# Patient Record
Sex: FEMALE | Race: WHITE | NOT HISPANIC OR LATINO | Employment: FULL TIME | URBAN - NONMETROPOLITAN AREA
[De-identification: names, ages, dates, MRNs, and addresses within clinical notes are randomized per-mention and may not be internally consistent; named-entity substitution may affect disease eponyms.]

---

## 2020-10-19 ENCOUNTER — HOSPITAL ENCOUNTER (EMERGENCY)
Facility: HOSPITAL | Age: 65
Discharge: ANOTHER HOSPITAL | End: 2020-10-19
Attending: EMERGENCY MEDICINE | Admitting: EMERGENCY MEDICINE
Payer: COMMERCIAL

## 2020-10-19 ENCOUNTER — APPOINTMENT (EMERGENCY)
Dept: RADIOLOGY | Facility: HOSPITAL | Age: 65
End: 2020-10-19
Payer: COMMERCIAL

## 2020-10-19 VITALS
WEIGHT: 165.79 LBS | HEART RATE: 76 BPM | OXYGEN SATURATION: 98 % | DIASTOLIC BLOOD PRESSURE: 65 MMHG | HEIGHT: 65 IN | BODY MASS INDEX: 27.62 KG/M2 | RESPIRATION RATE: 21 BRPM | SYSTOLIC BLOOD PRESSURE: 107 MMHG

## 2020-10-19 DIAGNOSIS — S20.211A CHEST WALL HEMATOMA, RIGHT, INITIAL ENCOUNTER: Primary | ICD-10-CM

## 2020-10-19 LAB
ALBUMIN SERPL BCP-MCNC: 3.4 G/DL (ref 3.5–5)
ALP SERPL-CCNC: 84 U/L (ref 46–116)
ALT SERPL W P-5'-P-CCNC: 36 U/L (ref 12–78)
ANION GAP SERPL CALCULATED.3IONS-SCNC: 7 MMOL/L (ref 4–13)
APTT PPP: 23 SECONDS (ref 23–37)
AST SERPL W P-5'-P-CCNC: 22 U/L (ref 5–45)
ATRIAL RATE: 76 BPM
BASOPHILS # BLD AUTO: 0.08 THOUSANDS/ΜL (ref 0–0.1)
BASOPHILS NFR BLD AUTO: 1 % (ref 0–1)
BILIRUB SERPL-MCNC: 0.37 MG/DL (ref 0.2–1)
BUN SERPL-MCNC: 19 MG/DL (ref 5–25)
CALCIUM ALBUM COR SERPL-MCNC: 9.6 MG/DL (ref 8.3–10.1)
CALCIUM SERPL-MCNC: 9.1 MG/DL (ref 8.3–10.1)
CHLORIDE SERPL-SCNC: 104 MMOL/L (ref 100–108)
CO2 SERPL-SCNC: 30 MMOL/L (ref 21–32)
CREAT SERPL-MCNC: 0.85 MG/DL (ref 0.6–1.3)
EOSINOPHIL # BLD AUTO: 0.13 THOUSAND/ΜL (ref 0–0.61)
EOSINOPHIL NFR BLD AUTO: 2 % (ref 0–6)
ERYTHROCYTE [DISTWIDTH] IN BLOOD BY AUTOMATED COUNT: 11.7 % (ref 11.6–15.1)
GFR SERPL CREATININE-BSD FRML MDRD: 73 ML/MIN/1.73SQ M
GLUCOSE SERPL-MCNC: 142 MG/DL (ref 65–140)
HCT VFR BLD AUTO: 37.7 % (ref 34.8–46.1)
HGB BLD-MCNC: 12.8 G/DL (ref 11.5–15.4)
IMM GRANULOCYTES # BLD AUTO: 0.02 THOUSAND/UL (ref 0–0.2)
IMM GRANULOCYTES NFR BLD AUTO: 0 % (ref 0–2)
INR PPP: 0.94 (ref 0.84–1.19)
LYMPHOCYTES # BLD AUTO: 1.57 THOUSANDS/ΜL (ref 0.6–4.47)
LYMPHOCYTES NFR BLD AUTO: 24 % (ref 14–44)
MCH RBC QN AUTO: 31.2 PG (ref 26.8–34.3)
MCHC RBC AUTO-ENTMCNC: 34 G/DL (ref 31.4–37.4)
MCV RBC AUTO: 92 FL (ref 82–98)
MONOCYTES # BLD AUTO: 0.56 THOUSAND/ΜL (ref 0.17–1.22)
MONOCYTES NFR BLD AUTO: 8 % (ref 4–12)
NEUTROPHILS # BLD AUTO: 4.31 THOUSANDS/ΜL (ref 1.85–7.62)
NEUTS SEG NFR BLD AUTO: 65 % (ref 43–75)
NRBC BLD AUTO-RTO: 0 /100 WBCS
P AXIS: 45 DEGREES
PLATELET # BLD AUTO: 345 THOUSANDS/UL (ref 149–390)
PMV BLD AUTO: 9.5 FL (ref 8.9–12.7)
POTASSIUM SERPL-SCNC: 3.8 MMOL/L (ref 3.5–5.3)
PR INTERVAL: 140 MS
PROT SERPL-MCNC: 6.8 G/DL (ref 6.4–8.2)
PROTHROMBIN TIME: 12.4 SECONDS (ref 11.6–14.5)
QRS AXIS: 86 DEGREES
QRSD INTERVAL: 92 MS
QT INTERVAL: 388 MS
QTC INTERVAL: 436 MS
RBC # BLD AUTO: 4.1 MILLION/UL (ref 3.81–5.12)
SODIUM SERPL-SCNC: 141 MMOL/L (ref 136–145)
T WAVE AXIS: 66 DEGREES
VENTRICULAR RATE: 76 BPM
WBC # BLD AUTO: 6.67 THOUSAND/UL (ref 4.31–10.16)

## 2020-10-19 PROCEDURE — 96360 HYDRATION IV INFUSION INIT: CPT

## 2020-10-19 PROCEDURE — 85730 THROMBOPLASTIN TIME PARTIAL: CPT | Performed by: EMERGENCY MEDICINE

## 2020-10-19 PROCEDURE — 93005 ELECTROCARDIOGRAM TRACING: CPT

## 2020-10-19 PROCEDURE — 99285 EMERGENCY DEPT VISIT HI MDM: CPT

## 2020-10-19 PROCEDURE — 80053 COMPREHEN METABOLIC PANEL: CPT | Performed by: EMERGENCY MEDICINE

## 2020-10-19 PROCEDURE — 85610 PROTHROMBIN TIME: CPT | Performed by: EMERGENCY MEDICINE

## 2020-10-19 PROCEDURE — 36415 COLL VENOUS BLD VENIPUNCTURE: CPT | Performed by: EMERGENCY MEDICINE

## 2020-10-19 PROCEDURE — 99285 EMERGENCY DEPT VISIT HI MDM: CPT | Performed by: EMERGENCY MEDICINE

## 2020-10-19 PROCEDURE — 85025 COMPLETE CBC W/AUTO DIFF WBC: CPT | Performed by: EMERGENCY MEDICINE

## 2020-10-19 PROCEDURE — 71045 X-RAY EXAM CHEST 1 VIEW: CPT

## 2020-10-19 RX ORDER — ROSUVASTATIN CALCIUM 10 MG/1
10 TABLET, COATED ORAL 3 TIMES WEEKLY
COMMUNITY

## 2020-10-19 RX ORDER — IBUPROFEN 600 MG/1
600 TABLET ORAL AS NEEDED
COMMUNITY

## 2020-10-19 RX ADMIN — SODIUM CHLORIDE 1000 ML: 0.9 INJECTION, SOLUTION INTRAVENOUS at 13:24

## 2021-11-24 ENCOUNTER — TELEPHONE (OUTPATIENT)
Dept: SURGERY | Facility: HOSPITAL | Age: 66
End: 2021-11-24

## 2021-11-24 VITALS — WEIGHT: 163 LBS | HEIGHT: 65 IN | BODY MASS INDEX: 27.16 KG/M2

## 2021-11-24 RX ORDER — VITAMIN B COMPLEX
TABLET ORAL
COMMUNITY

## 2021-11-24 RX ORDER — MULTIVIT-MIN/IRON/FOLIC ACID/K 18-600-40
CAPSULE ORAL
COMMUNITY

## 2021-11-26 ENCOUNTER — TELEPHONE (OUTPATIENT)
Dept: SURGERY | Facility: HOSPITAL | Age: 66
End: 2021-11-26

## 2021-11-29 ENCOUNTER — ANESTHESIA (OUTPATIENT)
Dept: GASTROENTEROLOGY | Facility: HOSPITAL | Age: 66
End: 2021-11-29

## 2021-11-29 ENCOUNTER — ANESTHESIA EVENT (OUTPATIENT)
Dept: GASTROENTEROLOGY | Facility: HOSPITAL | Age: 66
End: 2021-11-29

## 2021-11-29 ENCOUNTER — HOSPITAL ENCOUNTER (OUTPATIENT)
Dept: GASTROENTEROLOGY | Facility: HOSPITAL | Age: 66
Setting detail: OUTPATIENT SURGERY
Discharge: HOME | End: 2021-11-29
Attending: INTERNAL MEDICINE | Admitting: INTERNAL MEDICINE
Payer: MEDICARE

## 2021-11-29 VITALS
DIASTOLIC BLOOD PRESSURE: 59 MMHG | OXYGEN SATURATION: 97 % | TEMPERATURE: 97.8 F | BODY MASS INDEX: 27.16 KG/M2 | WEIGHT: 163 LBS | HEIGHT: 65 IN | RESPIRATION RATE: 16 BRPM | HEART RATE: 69 BPM | SYSTOLIC BLOOD PRESSURE: 132 MMHG

## 2021-11-29 DIAGNOSIS — R93.3 ABNORMAL FINDINGS ON DIAGNOSTIC IMAGING OF OTHER PARTS OF DIGESTIVE TRACT: ICD-10-CM

## 2021-11-29 DIAGNOSIS — Z86.010 PERSONAL HISTORY OF COLONIC POLYPS: ICD-10-CM

## 2021-11-29 PROBLEM — E78.5 HYPERLIPIDEMIA: Status: ACTIVE | Noted: 2021-11-29

## 2021-11-29 RX ORDER — ACETAMINOPHEN 325 MG/1
650 TABLET ORAL EVERY 6 HOURS PRN
COMMUNITY

## 2021-11-29 RX ORDER — PROPOFOL 10 MG/ML
INJECTION, EMULSION INTRAVENOUS AS NEEDED
Status: DISCONTINUED | OUTPATIENT
Start: 2021-11-29 | End: 2021-11-29

## 2021-11-29 RX ORDER — LIDOCAINE HYDROCHLORIDE 10 MG/ML
INJECTION, SOLUTION EPIDURAL; INFILTRATION; INTRACAUDAL; PERINEURAL AS NEEDED
Status: DISCONTINUED | OUTPATIENT
Start: 2021-11-29 | End: 2021-11-29

## 2021-11-29 RX ORDER — PROPOFOL 10 MG/ML
INJECTION, EMULSION INTRAVENOUS CONTINUOUS PRN
Status: DISCONTINUED | OUTPATIENT
Start: 2021-11-29 | End: 2021-11-29

## 2021-11-29 RX ORDER — SODIUM CHLORIDE, SODIUM LACTATE, POTASSIUM CHLORIDE, CALCIUM CHLORIDE 600; 310; 30; 20 MG/100ML; MG/100ML; MG/100ML; MG/100ML
INJECTION, SOLUTION INTRAVENOUS CONTINUOUS PRN
Status: DISCONTINUED | OUTPATIENT
Start: 2021-11-29 | End: 2021-11-29

## 2021-11-29 RX ADMIN — PROPOFOL 120 MCG/KG/MIN: 10 INJECTION, EMULSION INTRAVENOUS at 09:30

## 2021-11-29 RX ADMIN — LIDOCAINE HYDROCHLORIDE 50 MG: 10 INJECTION, SOLUTION EPIDURAL; INFILTRATION; INTRACAUDAL at 09:30

## 2021-11-29 RX ADMIN — PROPOFOL 100 MG: 10 INJECTION, EMULSION INTRAVENOUS at 09:30

## 2021-11-29 RX ADMIN — SODIUM CHLORIDE, SODIUM LACTATE, POTASSIUM CHLORIDE, AND CALCIUM CHLORIDE: .6; .31; .03; .02 INJECTION, SOLUTION INTRAVENOUS at 09:24

## 2022-03-30 ENCOUNTER — APPOINTMENT (OUTPATIENT)
Dept: LAB | Facility: HOSPITAL | Age: 67
End: 2022-03-30
Payer: MEDICARE

## 2022-03-30 DIAGNOSIS — R73.01 ELEVATED FASTING BLOOD SUGAR: ICD-10-CM

## 2022-03-30 DIAGNOSIS — E78.2 MIXED HYPERLIPIDEMIA: ICD-10-CM

## 2022-03-30 DIAGNOSIS — M81.0 SENILE OSTEOPOROSIS: Primary | ICD-10-CM

## 2022-03-30 DIAGNOSIS — D50.0 BLOOD LOSS ANEMIA: ICD-10-CM

## 2022-03-30 LAB
ALT SERPL W P-5'-P-CCNC: 32 U/L (ref 12–78)
AST SERPL W P-5'-P-CCNC: 17 U/L (ref 5–45)
BASOPHILS # BLD AUTO: 0.06 THOUSANDS/ΜL (ref 0–0.1)
BASOPHILS NFR BLD AUTO: 1 % (ref 0–1)
CALCIUM SERPL-MCNC: 9 MG/DL (ref 8.3–10.1)
CHOLEST SERPL-MCNC: 230 MG/DL
EOSINOPHIL # BLD AUTO: 0.08 THOUSAND/ΜL (ref 0–0.61)
EOSINOPHIL NFR BLD AUTO: 2 % (ref 0–6)
ERYTHROCYTE [DISTWIDTH] IN BLOOD BY AUTOMATED COUNT: 11.9 % (ref 11.6–15.1)
EST. AVERAGE GLUCOSE BLD GHB EST-MCNC: 114 MG/DL
GLUCOSE P FAST SERPL-MCNC: 107 MG/DL (ref 65–99)
HBA1C MFR BLD: 5.6 %
HCT VFR BLD AUTO: 46.6 % (ref 34.8–46.1)
HDLC SERPL-MCNC: 83 MG/DL
HGB BLD-MCNC: 15.5 G/DL (ref 11.5–15.4)
IMM GRANULOCYTES # BLD AUTO: 0.01 THOUSAND/UL (ref 0–0.2)
IMM GRANULOCYTES NFR BLD AUTO: 0 % (ref 0–2)
LDLC SERPL CALC-MCNC: 137 MG/DL (ref 0–100)
LYMPHOCYTES # BLD AUTO: 1.18 THOUSANDS/ΜL (ref 0.6–4.47)
LYMPHOCYTES NFR BLD AUTO: 24 % (ref 14–44)
MAGNESIUM SERPL-MCNC: 1.5 MG/DL (ref 1.6–2.6)
MCH RBC QN AUTO: 30.3 PG (ref 26.8–34.3)
MCHC RBC AUTO-ENTMCNC: 33.3 G/DL (ref 31.4–37.4)
MCV RBC AUTO: 91 FL (ref 82–98)
MONOCYTES # BLD AUTO: 0.47 THOUSAND/ΜL (ref 0.17–1.22)
MONOCYTES NFR BLD AUTO: 10 % (ref 4–12)
NEUTROPHILS # BLD AUTO: 3.07 THOUSANDS/ΜL (ref 1.85–7.62)
NEUTS SEG NFR BLD AUTO: 63 % (ref 43–75)
NONHDLC SERPL-MCNC: 147 MG/DL
NRBC BLD AUTO-RTO: 0 /100 WBCS
PHOSPHATE SERPL-MCNC: 4.3 MG/DL (ref 2.3–4.1)
PLATELET # BLD AUTO: 326 THOUSANDS/UL (ref 149–390)
PMV BLD AUTO: 9.5 FL (ref 8.9–12.7)
RBC # BLD AUTO: 5.11 MILLION/UL (ref 3.81–5.12)
TRIGL SERPL-MCNC: 49 MG/DL
WBC # BLD AUTO: 4.87 THOUSAND/UL (ref 4.31–10.16)

## 2022-03-30 PROCEDURE — 84450 TRANSFERASE (AST) (SGOT): CPT

## 2022-03-30 PROCEDURE — 83036 HEMOGLOBIN GLYCOSYLATED A1C: CPT

## 2022-03-30 PROCEDURE — 84100 ASSAY OF PHOSPHORUS: CPT

## 2022-03-30 PROCEDURE — 36415 COLL VENOUS BLD VENIPUNCTURE: CPT

## 2022-03-30 PROCEDURE — 85025 COMPLETE CBC W/AUTO DIFF WBC: CPT

## 2022-03-30 PROCEDURE — 80061 LIPID PANEL: CPT

## 2022-03-30 PROCEDURE — 82310 ASSAY OF CALCIUM: CPT

## 2022-03-30 PROCEDURE — 82947 ASSAY GLUCOSE BLOOD QUANT: CPT

## 2022-03-30 PROCEDURE — 83735 ASSAY OF MAGNESIUM: CPT

## 2022-03-30 PROCEDURE — 84460 ALANINE AMINO (ALT) (SGPT): CPT

## 2022-05-10 ENCOUNTER — APPOINTMENT (OUTPATIENT)
Dept: LAB | Facility: HOSPITAL | Age: 67
End: 2022-05-10
Payer: MEDICARE

## 2022-05-10 DIAGNOSIS — R23.8 VESICULAR ERUPTION OF SKIN: ICD-10-CM

## 2022-05-10 LAB
APTT PPP: 28 SECONDS (ref 23–37)
BASOPHILS # BLD AUTO: 0.06 THOUSANDS/ΜL (ref 0–0.1)
BASOPHILS NFR BLD AUTO: 1 % (ref 0–1)
EOSINOPHIL # BLD AUTO: 0.08 THOUSAND/ΜL (ref 0–0.61)
EOSINOPHIL NFR BLD AUTO: 1 % (ref 0–6)
ERYTHROCYTE [DISTWIDTH] IN BLOOD BY AUTOMATED COUNT: 11.8 % (ref 11.6–15.1)
HCT VFR BLD AUTO: 43.3 % (ref 34.8–46.1)
HGB BLD-MCNC: 14.5 G/DL (ref 11.5–15.4)
IMM GRANULOCYTES # BLD AUTO: 0.02 THOUSAND/UL (ref 0–0.2)
IMM GRANULOCYTES NFR BLD AUTO: 0 % (ref 0–2)
INR PPP: 0.88 (ref 0.84–1.19)
LYMPHOCYTES # BLD AUTO: 1.69 THOUSANDS/ΜL (ref 0.6–4.47)
LYMPHOCYTES NFR BLD AUTO: 22 % (ref 14–44)
MCH RBC QN AUTO: 30.1 PG (ref 26.8–34.3)
MCHC RBC AUTO-ENTMCNC: 33.5 G/DL (ref 31.4–37.4)
MCV RBC AUTO: 90 FL (ref 82–98)
MONOCYTES # BLD AUTO: 0.67 THOUSAND/ΜL (ref 0.17–1.22)
MONOCYTES NFR BLD AUTO: 9 % (ref 4–12)
NEUTROPHILS # BLD AUTO: 5.14 THOUSANDS/ΜL (ref 1.85–7.62)
NEUTS SEG NFR BLD AUTO: 67 % (ref 43–75)
NRBC BLD AUTO-RTO: 0 /100 WBCS
PLATELET # BLD AUTO: 408 THOUSANDS/UL (ref 149–390)
PMV BLD AUTO: 9.3 FL (ref 8.9–12.7)
PROTHROMBIN TIME: 11.9 SECONDS (ref 11.6–14.5)
RBC # BLD AUTO: 4.82 MILLION/UL (ref 3.81–5.12)
WBC # BLD AUTO: 7.66 THOUSAND/UL (ref 4.31–10.16)

## 2022-05-10 PROCEDURE — 36415 COLL VENOUS BLD VENIPUNCTURE: CPT

## 2022-05-10 PROCEDURE — 85610 PROTHROMBIN TIME: CPT

## 2022-05-10 PROCEDURE — 85025 COMPLETE CBC W/AUTO DIFF WBC: CPT

## 2022-05-10 PROCEDURE — 85730 THROMBOPLASTIN TIME PARTIAL: CPT

## 2022-10-01 ENCOUNTER — APPOINTMENT (OUTPATIENT)
Dept: LAB | Facility: HOSPITAL | Age: 67
End: 2022-10-01
Payer: MEDICARE

## 2022-10-01 DIAGNOSIS — M81.0 SENILE OSTEOPOROSIS: ICD-10-CM

## 2022-10-01 DIAGNOSIS — E55.9 AVITAMINOSIS D: ICD-10-CM

## 2022-10-01 LAB
25(OH)D3 SERPL-MCNC: 51.7 NG/ML (ref 30–100)
CALCIUM SERPL-MCNC: 9.6 MG/DL (ref 8.3–10.1)
MAGNESIUM SERPL-MCNC: 1.8 MG/DL (ref 1.6–2.6)
PHOSPHATE SERPL-MCNC: 4.1 MG/DL (ref 2.3–4.1)

## 2022-10-01 PROCEDURE — 84100 ASSAY OF PHOSPHORUS: CPT

## 2022-10-01 PROCEDURE — 36415 COLL VENOUS BLD VENIPUNCTURE: CPT

## 2022-10-01 PROCEDURE — 83735 ASSAY OF MAGNESIUM: CPT

## 2022-10-01 PROCEDURE — 82310 ASSAY OF CALCIUM: CPT

## 2022-10-01 PROCEDURE — 82306 VITAMIN D 25 HYDROXY: CPT

## 2022-10-14 ENCOUNTER — APPOINTMENT (OUTPATIENT)
Dept: LAB | Facility: HOSPITAL | Age: 67
End: 2022-10-14
Payer: MEDICARE

## 2022-10-14 DIAGNOSIS — R73.01 ELEVATED FASTING BLOOD SUGAR: ICD-10-CM

## 2022-10-14 DIAGNOSIS — D50.0 BLOOD LOSS ANEMIA: ICD-10-CM

## 2022-10-14 DIAGNOSIS — M81.0 OSTEOPOROSIS, UNSPECIFIED OSTEOPOROSIS TYPE, UNSPECIFIED PATHOLOGICAL FRACTURE PRESENCE: ICD-10-CM

## 2022-10-14 DIAGNOSIS — E78.2 MIXED HYPERLIPIDEMIA: ICD-10-CM

## 2022-10-14 LAB
ALT SERPL W P-5'-P-CCNC: 35 U/L (ref 12–78)
AST SERPL W P-5'-P-CCNC: 21 U/L (ref 5–45)
BASOPHILS # BLD AUTO: 0.06 THOUSANDS/ΜL (ref 0–0.1)
BASOPHILS NFR BLD AUTO: 1 % (ref 0–1)
CHOLEST SERPL-MCNC: 237 MG/DL
CREAT SERPL-MCNC: 0.86 MG/DL (ref 0.6–1.3)
EOSINOPHIL # BLD AUTO: 0.06 THOUSAND/ΜL (ref 0–0.61)
EOSINOPHIL NFR BLD AUTO: 1 % (ref 0–6)
ERYTHROCYTE [DISTWIDTH] IN BLOOD BY AUTOMATED COUNT: 12.2 % (ref 11.6–15.1)
EST. AVERAGE GLUCOSE BLD GHB EST-MCNC: 117 MG/DL
GFR SERPL CREATININE-BSD FRML MDRD: 70 ML/MIN/1.73SQ M
GLUCOSE P FAST SERPL-MCNC: 101 MG/DL (ref 65–99)
HBA1C MFR BLD: 5.7 %
HCT VFR BLD AUTO: 44.4 % (ref 34.8–46.1)
HDLC SERPL-MCNC: 93 MG/DL
HGB BLD-MCNC: 14.9 G/DL (ref 11.5–15.4)
IMM GRANULOCYTES # BLD AUTO: 0.01 THOUSAND/UL (ref 0–0.2)
IMM GRANULOCYTES NFR BLD AUTO: 0 % (ref 0–2)
LDLC SERPL CALC-MCNC: 134 MG/DL (ref 0–100)
LYMPHOCYTES # BLD AUTO: 1.25 THOUSANDS/ΜL (ref 0.6–4.47)
LYMPHOCYTES NFR BLD AUTO: 24 % (ref 14–44)
MCH RBC QN AUTO: 29.7 PG (ref 26.8–34.3)
MCHC RBC AUTO-ENTMCNC: 33.6 G/DL (ref 31.4–37.4)
MCV RBC AUTO: 89 FL (ref 82–98)
MONOCYTES # BLD AUTO: 0.53 THOUSAND/ΜL (ref 0.17–1.22)
MONOCYTES NFR BLD AUTO: 10 % (ref 4–12)
NEUTROPHILS # BLD AUTO: 3.36 THOUSANDS/ΜL (ref 1.85–7.62)
NEUTS SEG NFR BLD AUTO: 64 % (ref 43–75)
NONHDLC SERPL-MCNC: 144 MG/DL
NRBC BLD AUTO-RTO: 0 /100 WBCS
PLATELET # BLD AUTO: 343 THOUSANDS/UL (ref 149–390)
PMV BLD AUTO: 9.2 FL (ref 8.9–12.7)
RBC # BLD AUTO: 5.01 MILLION/UL (ref 3.81–5.12)
TRIGL SERPL-MCNC: 51 MG/DL
WBC # BLD AUTO: 5.27 THOUSAND/UL (ref 4.31–10.16)

## 2022-10-14 PROCEDURE — 84450 TRANSFERASE (AST) (SGOT): CPT

## 2022-10-14 PROCEDURE — 36415 COLL VENOUS BLD VENIPUNCTURE: CPT

## 2022-10-14 PROCEDURE — 80061 LIPID PANEL: CPT

## 2022-10-14 PROCEDURE — 82565 ASSAY OF CREATININE: CPT

## 2022-10-14 PROCEDURE — 83036 HEMOGLOBIN GLYCOSYLATED A1C: CPT

## 2022-10-14 PROCEDURE — 82947 ASSAY GLUCOSE BLOOD QUANT: CPT

## 2022-10-14 PROCEDURE — 84460 ALANINE AMINO (ALT) (SGPT): CPT

## 2022-10-14 PROCEDURE — 85025 COMPLETE CBC W/AUTO DIFF WBC: CPT

## 2022-12-13 DIAGNOSIS — Z12.31 ENCOUNTER FOR SCREENING MAMMOGRAM FOR MALIGNANT NEOPLASM OF BREAST: ICD-10-CM

## 2022-12-13 DIAGNOSIS — Z85.3 PERSONAL HISTORY OF MALIGNANT NEOPLASM OF BREAST: ICD-10-CM

## 2022-12-14 ENCOUNTER — HOSPITAL ENCOUNTER (OUTPATIENT)
Dept: RADIOLOGY | Facility: CLINIC | Age: 67
Discharge: HOME | End: 2022-12-14

## 2022-12-14 VITALS — BODY MASS INDEX: 27.16 KG/M2 | HEIGHT: 65 IN | WEIGHT: 163 LBS

## 2022-12-14 DIAGNOSIS — Z85.3 PERSONAL HISTORY OF MALIGNANT NEOPLASM OF BREAST: ICD-10-CM

## 2023-05-16 ENCOUNTER — APPOINTMENT (OUTPATIENT)
Dept: LAB | Facility: HOSPITAL | Age: 68
End: 2023-05-16

## 2023-05-16 DIAGNOSIS — R73.01 ELEVATED FASTING BLOOD SUGAR: ICD-10-CM

## 2023-05-16 DIAGNOSIS — D50.0 BLOOD LOSS ANEMIA: ICD-10-CM

## 2023-05-16 DIAGNOSIS — E78.5 HYPERLIPIDEMIA, UNSPECIFIED HYPERLIPIDEMIA TYPE: ICD-10-CM

## 2023-05-16 LAB
ALT SERPL W P-5'-P-CCNC: 19 U/L (ref 7–52)
AST SERPL W P-5'-P-CCNC: 17 U/L (ref 13–39)
BASOPHILS # BLD AUTO: 0.05 THOUSANDS/ÂΜL (ref 0–0.1)
BASOPHILS NFR BLD AUTO: 1 % (ref 0–1)
CHOLEST SERPL-MCNC: 203 MG/DL
EOSINOPHIL # BLD AUTO: 0.1 THOUSAND/ÂΜL (ref 0–0.61)
EOSINOPHIL NFR BLD AUTO: 2 % (ref 0–6)
ERYTHROCYTE [DISTWIDTH] IN BLOOD BY AUTOMATED COUNT: 14.4 % (ref 11.6–15.1)
EST. AVERAGE GLUCOSE BLD GHB EST-MCNC: 123 MG/DL
GLUCOSE P FAST SERPL-MCNC: 99 MG/DL (ref 65–99)
HBA1C MFR BLD: 5.9 %
HCT VFR BLD AUTO: 37 % (ref 34.8–46.1)
HDLC SERPL-MCNC: 72 MG/DL
HGB BLD-MCNC: 11.3 G/DL (ref 11.5–15.4)
IMM GRANULOCYTES # BLD AUTO: 0.04 THOUSAND/UL (ref 0–0.2)
IMM GRANULOCYTES NFR BLD AUTO: 1 % (ref 0–2)
LDLC SERPL CALC-MCNC: 121 MG/DL (ref 0–100)
LYMPHOCYTES # BLD AUTO: 1.2 THOUSANDS/ÂΜL (ref 0.6–4.47)
LYMPHOCYTES NFR BLD AUTO: 25 % (ref 14–44)
MCH RBC QN AUTO: 25.1 PG (ref 26.8–34.3)
MCHC RBC AUTO-ENTMCNC: 30.5 G/DL (ref 31.4–37.4)
MCV RBC AUTO: 82 FL (ref 82–98)
MONOCYTES # BLD AUTO: 0.59 THOUSAND/ÂΜL (ref 0.17–1.22)
MONOCYTES NFR BLD AUTO: 13 % (ref 4–12)
NEUTROPHILS # BLD AUTO: 2.75 THOUSANDS/ÂΜL (ref 1.85–7.62)
NEUTS SEG NFR BLD AUTO: 58 % (ref 43–75)
NONHDLC SERPL-MCNC: 131 MG/DL
NRBC BLD AUTO-RTO: 0 /100 WBCS
PLATELET # BLD AUTO: 563 THOUSANDS/UL (ref 149–390)
PMV BLD AUTO: 8.9 FL (ref 8.9–12.7)
RBC # BLD AUTO: 4.51 MILLION/UL (ref 3.81–5.12)
TRIGL SERPL-MCNC: 49 MG/DL
WBC # BLD AUTO: 4.73 THOUSAND/UL (ref 4.31–10.16)

## 2023-05-26 ENCOUNTER — HOSPITAL ENCOUNTER (OUTPATIENT)
Dept: NON INVASIVE DIAGNOSTICS | Facility: HOSPITAL | Age: 68
Discharge: HOME | End: 2023-05-26

## 2023-05-26 DIAGNOSIS — R06.09 DYSPNEA ON EXERTION: ICD-10-CM

## 2023-05-26 LAB
CHEST PAIN STATEMENT: NORMAL
MAX DIASTOLIC BP: 78 MMHG
MAX HEART RATE: 153 BPM
MAX HR PERCENT: 100 %
MAX HR: 153 BPM
MAX PREDICTED HEART RATE: 153 BPM
MAX. SYSTOLIC BP: 188 MMHG
PROTOCOL NAME: NORMAL
RATE PRESSURE PRODUCT: NORMAL
SL CV STRESS RECOVERY BP: NORMAL MMHG
SL CV STRESS RECOVERY HR: 90 BPM
SL CV STRESS RECOVERY O2 SAT: 98 %
SL CV STRESS STAGE REACHED: 4
STRESS ANGINA INDEX: 0
STRESS BASELINE BP: NORMAL MMHG
STRESS BASELINE HR: 70 BPM
STRESS O2 SAT REST: 98 %
STRESS PEAK HR: 153 BPM
STRESS POST ESTIMATED WORKLOAD: 12.4 METS
STRESS POST EXERCISE DUR MIN: 9 MIN
STRESS POST EXERCISE DUR SEC: 43 SEC
STRESS POST O2 SAT PEAK: 98 %
STRESS POST PEAK BP: 188 MMHG
TARGET HR FORMULA: NORMAL
TEST INDICATION: NORMAL
TIME IN EXERCISE PHASE: NORMAL

## 2023-05-26 RX ORDER — ZOLEDRONIC ACID 5 MG/100ML
5 INJECTION, SOLUTION INTRAVENOUS
COMMUNITY

## 2023-05-26 RX ORDER — PROMETHAZINE HYDROCHLORIDE 25 MG/1
TABLET ORAL
COMMUNITY

## 2023-06-16 ENCOUNTER — APPOINTMENT (OUTPATIENT)
Dept: LAB | Facility: HOSPITAL | Age: 68
End: 2023-06-16
Payer: MEDICARE

## 2023-06-16 DIAGNOSIS — D68.59 THROMBOPHILIA (HCC): ICD-10-CM

## 2023-06-16 DIAGNOSIS — D64.9 ANEMIA, UNSPECIFIED TYPE: ICD-10-CM

## 2023-06-16 LAB
BASOPHILS # BLD AUTO: 0.08 THOUSANDS/ÂΜL (ref 0–0.1)
BASOPHILS NFR BLD AUTO: 1 % (ref 0–1)
EOSINOPHIL # BLD AUTO: 0.08 THOUSAND/ÂΜL (ref 0–0.61)
EOSINOPHIL NFR BLD AUTO: 1 % (ref 0–6)
ERYTHROCYTE [DISTWIDTH] IN BLOOD BY AUTOMATED COUNT: 15.2 % (ref 11.6–15.1)
HCT VFR BLD AUTO: 36.6 % (ref 34.8–46.1)
HGB BLD-MCNC: 11.2 G/DL (ref 11.5–15.4)
IMM GRANULOCYTES # BLD AUTO: 0.02 THOUSAND/UL (ref 0–0.2)
IMM GRANULOCYTES NFR BLD AUTO: 0 % (ref 0–2)
LYMPHOCYTES # BLD AUTO: 1.3 THOUSANDS/ÂΜL (ref 0.6–4.47)
LYMPHOCYTES NFR BLD AUTO: 16 % (ref 14–44)
MCH RBC QN AUTO: 24.2 PG (ref 26.8–34.3)
MCHC RBC AUTO-ENTMCNC: 30.6 G/DL (ref 31.4–37.4)
MCV RBC AUTO: 79 FL (ref 82–98)
MONOCYTES # BLD AUTO: 0.85 THOUSAND/ÂΜL (ref 0.17–1.22)
MONOCYTES NFR BLD AUTO: 11 % (ref 4–12)
NEUTROPHILS # BLD AUTO: 5.67 THOUSANDS/ÂΜL (ref 1.85–7.62)
NEUTS SEG NFR BLD AUTO: 71 % (ref 43–75)
NRBC BLD AUTO-RTO: 0 /100 WBCS
PLATELET # BLD AUTO: 432 THOUSANDS/UL (ref 149–390)
PMV BLD AUTO: 9.2 FL (ref 8.9–12.7)
RBC # BLD AUTO: 4.62 MILLION/UL (ref 3.81–5.12)
WBC # BLD AUTO: 8 THOUSAND/UL (ref 4.31–10.16)

## 2023-06-16 PROCEDURE — 85025 COMPLETE CBC W/AUTO DIFF WBC: CPT

## 2023-06-16 PROCEDURE — 36415 COLL VENOUS BLD VENIPUNCTURE: CPT

## 2023-10-09 ENCOUNTER — APPOINTMENT (OUTPATIENT)
Dept: LAB | Facility: HOSPITAL | Age: 68
End: 2023-10-09
Payer: MEDICARE

## 2023-10-09 DIAGNOSIS — D64.9 ANEMIA, UNSPECIFIED TYPE: ICD-10-CM

## 2023-10-09 DIAGNOSIS — M81.0 OSTEOPOROSIS, UNSPECIFIED OSTEOPOROSIS TYPE, UNSPECIFIED PATHOLOGICAL FRACTURE PRESENCE: ICD-10-CM

## 2023-10-09 LAB
BASOPHILS # BLD AUTO: 0.05 THOUSANDS/ÂΜL (ref 0–0.1)
BASOPHILS NFR BLD AUTO: 1 % (ref 0–1)
CREAT SERPL-MCNC: 0.81 MG/DL (ref 0.6–1.3)
EOSINOPHIL # BLD AUTO: 0.11 THOUSAND/ÂΜL (ref 0–0.61)
EOSINOPHIL NFR BLD AUTO: 2 % (ref 0–6)
ERYTHROCYTE [DISTWIDTH] IN BLOOD BY AUTOMATED COUNT: 16.5 % (ref 11.6–15.1)
FERRITIN SERPL-MCNC: 5 NG/ML (ref 11–307)
GFR SERPL CREATININE-BSD FRML MDRD: 75 ML/MIN/1.73SQ M
HCT VFR BLD AUTO: 43.8 % (ref 34.8–46.1)
HGB BLD-MCNC: 14.3 G/DL (ref 11.5–15.4)
IMM GRANULOCYTES # BLD AUTO: 0.02 THOUSAND/UL (ref 0–0.2)
IMM GRANULOCYTES NFR BLD AUTO: 0 % (ref 0–2)
IRON SATN MFR SERPL: 13 % (ref 15–50)
IRON SERPL-MCNC: 54 UG/DL (ref 50–212)
LYMPHOCYTES # BLD AUTO: 1.33 THOUSANDS/ÂΜL (ref 0.6–4.47)
LYMPHOCYTES NFR BLD AUTO: 25 % (ref 14–44)
MCH RBC QN AUTO: 27.1 PG (ref 26.8–34.3)
MCHC RBC AUTO-ENTMCNC: 32.6 G/DL (ref 31.4–37.4)
MCV RBC AUTO: 83 FL (ref 82–98)
MONOCYTES # BLD AUTO: 0.5 THOUSAND/ÂΜL (ref 0.17–1.22)
MONOCYTES NFR BLD AUTO: 9 % (ref 4–12)
NEUTROPHILS # BLD AUTO: 3.3 THOUSANDS/ÂΜL (ref 1.85–7.62)
NEUTS SEG NFR BLD AUTO: 63 % (ref 43–75)
NRBC BLD AUTO-RTO: 0 /100 WBCS
PLATELET # BLD AUTO: 346 THOUSANDS/UL (ref 149–390)
PMV BLD AUTO: 9.6 FL (ref 8.9–12.7)
RBC # BLD AUTO: 5.28 MILLION/UL (ref 3.81–5.12)
TIBC SERPL-MCNC: 417 UG/DL (ref 250–450)
UIBC SERPL-MCNC: 363 UG/DL (ref 155–355)
WBC # BLD AUTO: 5.31 THOUSAND/UL (ref 4.31–10.16)

## 2023-10-09 PROCEDURE — 83540 ASSAY OF IRON: CPT

## 2023-10-09 PROCEDURE — 85025 COMPLETE CBC W/AUTO DIFF WBC: CPT

## 2023-10-09 PROCEDURE — 82728 ASSAY OF FERRITIN: CPT

## 2023-10-09 PROCEDURE — 82565 ASSAY OF CREATININE: CPT

## 2023-10-09 PROCEDURE — 36415 COLL VENOUS BLD VENIPUNCTURE: CPT

## 2023-10-09 PROCEDURE — 83550 IRON BINDING TEST: CPT

## 2023-11-30 ENCOUNTER — APPOINTMENT (OUTPATIENT)
Dept: LAB | Facility: HOSPITAL | Age: 68
End: 2023-11-30
Payer: MEDICARE

## 2023-11-30 DIAGNOSIS — E78.2 MIXED HYPERLIPIDEMIA: ICD-10-CM

## 2023-11-30 DIAGNOSIS — D68.59 THROMBOPHILIA (HCC): ICD-10-CM

## 2023-11-30 DIAGNOSIS — E55.9 VITAMIN D DEFICIENCY: ICD-10-CM

## 2023-11-30 DIAGNOSIS — R73.01 ELEVATED FASTING BLOOD SUGAR: ICD-10-CM

## 2023-11-30 DIAGNOSIS — D50.9 IRON DEFICIENCY ANEMIA, UNSPECIFIED IRON DEFICIENCY ANEMIA TYPE: ICD-10-CM

## 2023-11-30 LAB
25(OH)D3 SERPL-MCNC: 43.8 NG/ML (ref 30–100)
ALBUMIN SERPL BCP-MCNC: 4.6 G/DL (ref 3.5–5)
ALP SERPL-CCNC: 92 U/L (ref 34–104)
ALT SERPL W P-5'-P-CCNC: 22 U/L (ref 7–52)
ANION GAP SERPL CALCULATED.3IONS-SCNC: 7 MMOL/L
AST SERPL W P-5'-P-CCNC: 20 U/L (ref 13–39)
BASOPHILS # BLD AUTO: 0.07 THOUSANDS/ÂΜL (ref 0–0.1)
BASOPHILS NFR BLD AUTO: 1 % (ref 0–1)
BILIRUB SERPL-MCNC: 0.5 MG/DL (ref 0.2–1)
BUN SERPL-MCNC: 14 MG/DL (ref 5–25)
CALCIUM SERPL-MCNC: 9.4 MG/DL (ref 8.4–10.2)
CHLORIDE SERPL-SCNC: 103 MMOL/L (ref 96–108)
CHOLEST SERPL-MCNC: 241 MG/DL
CO2 SERPL-SCNC: 28 MMOL/L (ref 21–32)
CREAT SERPL-MCNC: 0.76 MG/DL (ref 0.6–1.3)
EOSINOPHIL # BLD AUTO: 0.08 THOUSAND/ÂΜL (ref 0–0.61)
EOSINOPHIL NFR BLD AUTO: 1 % (ref 0–6)
ERYTHROCYTE [DISTWIDTH] IN BLOOD BY AUTOMATED COUNT: 13.8 % (ref 11.6–15.1)
EST. AVERAGE GLUCOSE BLD GHB EST-MCNC: 126 MG/DL
GFR SERPL CREATININE-BSD FRML MDRD: 81 ML/MIN/1.73SQ M
GLUCOSE P FAST SERPL-MCNC: 101 MG/DL (ref 65–99)
HBA1C MFR BLD: 6 %
HCT VFR BLD AUTO: 46.6 % (ref 34.8–46.1)
HDLC SERPL-MCNC: 73 MG/DL
HGB BLD-MCNC: 14.8 G/DL (ref 11.5–15.4)
IMM GRANULOCYTES # BLD AUTO: 0.02 THOUSAND/UL (ref 0–0.2)
IMM GRANULOCYTES NFR BLD AUTO: 0 % (ref 0–2)
LDLC SERPL CALC-MCNC: 152 MG/DL (ref 0–100)
LYMPHOCYTES # BLD AUTO: 1.33 THOUSANDS/ÂΜL (ref 0.6–4.47)
LYMPHOCYTES NFR BLD AUTO: 22 % (ref 14–44)
MCH RBC QN AUTO: 27.9 PG (ref 26.8–34.3)
MCHC RBC AUTO-ENTMCNC: 31.8 G/DL (ref 31.4–37.4)
MCV RBC AUTO: 88 FL (ref 82–98)
MONOCYTES # BLD AUTO: 0.53 THOUSAND/ÂΜL (ref 0.17–1.22)
MONOCYTES NFR BLD AUTO: 9 % (ref 4–12)
NEUTROPHILS # BLD AUTO: 3.95 THOUSANDS/ÂΜL (ref 1.85–7.62)
NEUTS SEG NFR BLD AUTO: 67 % (ref 43–75)
NONHDLC SERPL-MCNC: 168 MG/DL
NRBC BLD AUTO-RTO: 0 /100 WBCS
PLATELET # BLD AUTO: 354 THOUSANDS/UL (ref 149–390)
PMV BLD AUTO: 9.3 FL (ref 8.9–12.7)
POTASSIUM SERPL-SCNC: 4 MMOL/L (ref 3.5–5.3)
PROT SERPL-MCNC: 7.7 G/DL (ref 6.4–8.4)
RBC # BLD AUTO: 5.3 MILLION/UL (ref 3.81–5.12)
SODIUM SERPL-SCNC: 138 MMOL/L (ref 135–147)
TRIGL SERPL-MCNC: 80 MG/DL
WBC # BLD AUTO: 5.98 THOUSAND/UL (ref 4.31–10.16)

## 2023-11-30 PROCEDURE — 83036 HEMOGLOBIN GLYCOSYLATED A1C: CPT

## 2023-11-30 PROCEDURE — 80053 COMPREHEN METABOLIC PANEL: CPT

## 2023-11-30 PROCEDURE — 85025 COMPLETE CBC W/AUTO DIFF WBC: CPT

## 2023-11-30 PROCEDURE — 80061 LIPID PANEL: CPT

## 2023-11-30 PROCEDURE — 82306 VITAMIN D 25 HYDROXY: CPT

## 2023-11-30 PROCEDURE — 36415 COLL VENOUS BLD VENIPUNCTURE: CPT

## 2023-12-20 ENCOUNTER — HOSPITAL ENCOUNTER (OUTPATIENT)
Dept: RADIOLOGY | Facility: CLINIC | Age: 68
Discharge: HOME | End: 2023-12-20
Payer: MEDICARE

## 2023-12-20 VITALS — WEIGHT: 165 LBS | HEIGHT: 65 IN | BODY MASS INDEX: 27.49 KG/M2

## 2023-12-20 DIAGNOSIS — Z85.3 PERSONAL HISTORY OF MALIGNANT NEOPLASM OF BREAST: ICD-10-CM

## 2023-12-20 PROCEDURE — G0279 TOMOSYNTHESIS, MAMMO: HCPCS

## 2023-12-20 PROCEDURE — 77065 DX MAMMO INCL CAD UNI: CPT

## 2024-04-19 ENCOUNTER — OFFICE VISIT (OUTPATIENT)
Dept: URGENT CARE | Facility: CLINIC | Age: 69
End: 2024-04-19
Payer: MEDICARE

## 2024-04-19 ENCOUNTER — APPOINTMENT (OUTPATIENT)
Dept: RADIOLOGY | Facility: CLINIC | Age: 69
End: 2024-04-19
Payer: MEDICARE

## 2024-04-19 VITALS
BODY MASS INDEX: 29.88 KG/M2 | SYSTOLIC BLOOD PRESSURE: 120 MMHG | WEIGHT: 175 LBS | HEIGHT: 64 IN | RESPIRATION RATE: 18 BRPM | HEART RATE: 70 BPM | TEMPERATURE: 97.5 F | OXYGEN SATURATION: 97 % | DIASTOLIC BLOOD PRESSURE: 64 MMHG

## 2024-04-19 DIAGNOSIS — S52.501A CLOSED FRACTURE OF DISTAL END OF RIGHT RADIUS, UNSPECIFIED FRACTURE MORPHOLOGY, INITIAL ENCOUNTER: Primary | ICD-10-CM

## 2024-04-19 DIAGNOSIS — M25.531 RIGHT WRIST PAIN: ICD-10-CM

## 2024-04-19 PROCEDURE — G0463 HOSPITAL OUTPT CLINIC VISIT: HCPCS

## 2024-04-19 PROCEDURE — 99213 OFFICE O/P EST LOW 20 MIN: CPT

## 2024-04-19 PROCEDURE — 73110 X-RAY EXAM OF WRIST: CPT

## 2024-04-19 NOTE — PROGRESS NOTES
[facility]        NAME: Faiza Gonzalez is a 69 y.o. female  : 1955    MRN: [id number]  DATE: 2024  TIME: 12:07 PM    Assessment and Plan   Closed fracture of distal end of right radius, unspecified fracture morphology, initial encounter [S52.501A]  1. Closed fracture of distal end of right radius, unspecified fracture morphology, initial encounter  XR wrist 3+ vw right    Ambulatory Referral to Orthopedic Surgery    Orthopedic injury treatment        Orthopedic injury treatment    Date/Time: 2024 12:06 PM    Performed by: YOAV Buenrostro  Authorized by: Bassem Ledbetter MD    Patient Location:  Clinic  [place] Protocol:  Consent: Verbal consent obtained.  Risks and benefits: risks, benefits and alternatives were discussed  Consent given by: patient  Patient understanding: patient states understanding of the procedure being performed  Patient identity confirmed: verbally with patient    Injury location:  Wrist  Location details:  Right wrist  Injury type:  Fracture  Fracture type: distal radius    Fracture type: distal radius    Neurovascular status: Neurovascularly intact    Distal perfusion: normal    Neurological function: normal    Range of motion: reduced    Immobilization:  Brace (quick fit right wrist brace)  Neurovascular status: Neurovascularly intact    Distal perfusion: normal    Neurological function: normal    Range of motion: unchanged    Patient tolerance:  Patient tolerated the procedure well with no immediate complications        Declines Tylenol/Ibuprofen in clinic. Ice applied by nursing staff. Preliminary XR read concerning for fracture of distal radius, final read pending. Wrist brace. Encouraged continued supportive measures.  RICE therapy. OTC Tylenol/Ibuprofen for pain. Follow up with PCP in 3-5 days or proceed to emergency department for worsening symptoms.  Patient verbalized understanding of instructions given.       Patient Instructions     Patient  Instructions     Preliminary XR read concerning for fracture, final read pending  Rest, Ice, Compression, and Elevation  OTC Tylenol/Ibuprofen for pain  Wrist brace   Referral placed to Orthopedic Surgery   Follow up with PCP in 3-5 days.  Proceed to  ER if symptoms worsen.    If tests have been performed at [facility], our office will contact you with results if changes need to be made to the care plan discussed with you at the visit.  You can review your full results on [facility] MyChart.    Wrist Fracture in Adults   AMBULATORY CARE:   A wrist fracture  is a break in one or more of the bones in your wrist.        Signs and symptoms:   Pain, swelling, and bruising of your injured wrist    Wrist pain that is worse when you hold something or put pressure on your wrist    Weakness, numbness, or tingling in your injured hand or wrist    Trouble moving your wrist, hand, or fingers    A change in the shape of your wrist    Seek care immediately if:   Your pain gets worse or does not get better after you take pain medicine.    Your cast or splint breaks, gets wet, or is damaged.    Your hand or fingers feel numb or cold.    Your hand or fingers turn white or blue.    Your splint or cast feels too tight.    You have more pain or swelling after the cast or splint is put on.    Call your doctor if:   You have a fever.    There is a foul smell or blood coming from under the cast.    You have questions or concerns about your condition or care.    Treatment for a wrist fracture  will depend on which wrist bone was broken and the kind of fracture you have. You may need any of the following:  Medicine  may be given to decrease pain and swelling. You may need antibiotic medicine or a tetanus shot if there is a break in your skin.    A cast, splint, or brace  may be placed on your wrist to decrease movement. These devices will help hold the bones in place while they heal.    Traction  may be needed if your bone broke into 2 pieces.  Traction pulls on the bone pieces to pull them back into place. A pin may be put in your bone or cast and hooked to ropes and a pulley. Weight is hung on the rope to help pull on the bones so they will heal correctly.    A closed reduction  is a procedure to put your bones into the correct position without surgery.    Surgery  may be needed to put your bones back into the correct position. Wires, pins, plates or screws may be used to help hold the bones in place.    Self-care:   Rest  as much as possible. Do not play contact sports until the healthcare provider says it is okay.    Apply ice  on your wrist for 15 to 20 minutes every hour or as directed. Use an ice pack, or put crushed ice in a plastic bag. Cover it with a towel before you place it on your skin. Ice helps prevent tissue damage and decreases swelling and pain.    Elevate  your wrist above the level of your heart as often as possible. This will help decrease swelling and pain. Prop your wrist on pillows or blankets to keep it elevated comfortably.       Cast or splint care:   You may take a bath or shower as directed. Do not let your cast or splint get wet. Before bathing, cover the cast or splint with 2 plastic trash bags. Tape the bags to your skin above the cast or splint to seal out the water. Keep your arm out of the water in case the bag breaks. If a plaster cast gets wet and soft, call your healthcare provider.    Check the skin around the cast or splint every day. You may put lotion on any red or sore areas.    Do not push down or lean on the cast or brace because it may break.    Do not  scratch the skin under the cast by putting a sharp or pointed object inside the cast.    Go to physical therapy as directed:  You may need physical therapy after your wrist heals and the cast is removed. A physical therapist can teach you exercises to help improve movement and strength and to decrease pain.  Follow up with your doctor or bone specialist as  directed:  You may need to return to have your cast removed. You may also need an x-ray to check how well the bone has healed. Write down your questions so you remember to ask them during your visits.  © Copyright [employer] 2023 Information is for End User's use only and may not be sold, redistributed or otherwise used for commercial purposes.  The above information is an  only. It is not intended as medical advice for individual conditions or treatments. Talk to your doctor, nurse or pharmacist before following any medical regimen to see if it is safe and effective for you.      Chief Complaint     Chief Complaint   Patient presents with    Fall     Fell today at about 10:00. Was weed whacking in yard and tripping and caught herself with right hand and now has wrist and lower arm pain.          History of Present Illness       69-year-old female presents with complaints of right wrist pain and swallowing following mechanical fall.  Patient reports weed whacking in yard when felt herself falling backwards and so did extend to right wrist and hand to catch fall (FOOSH injury).  Patient reports pain and swelling to radial aspect of right wrist without bruising.  Denies numbness, tingling, or weakness but does have reduced range of motion.  She did not take any OTC medications for her symptoms or apply ice as incident happened approximately 1.5 hours ago.  She is right-hand dominant.    Fall  Pertinent negatives include no abdominal pain, fever, nausea, numbness or vomiting.       Review of Systems   Review of Systems   Constitutional:  Negative for chills and fever.   Respiratory:  Negative for cough.    Gastrointestinal:  Negative for abdominal pain, diarrhea, nausea and vomiting.   Musculoskeletal:  Positive for arthralgias and joint swelling.   Skin:  Negative for color change and rash.   Neurological:  Negative for weakness and numbness.         Current Medications       Current Outpatient  Medications:     Coenzyme Q10 (CoQ10) 100 MG CAPS, Take by mouth, Disp: , Rfl:     ibuprofen (MOTRIN) 600 mg tablet, Take 600 mg by mouth as needed, Disp: , Rfl:     Multiple Vitamins-Iron TABS, Take by mouth, Disp: , Rfl:     rosuvastatin (CRESTOR) 10 MG tablet, Take 10 mg by mouth 3 (three) times a week  , Disp: , Rfl:     Vitamin D, Cholecalciferol, 25 MCG (1000 UT) TABS, Take by mouth, Disp: , Rfl:     zoledronic acid (RECLAST) 5 mg/100 mL IV infusion (premix), Inject 5 mg into a catheter in a vein, Disp: , Rfl:     Black Pepper-Turmeric 3-500 MG CAPS, Take by mouth, Disp: , Rfl:     Current Allergies     Allergies as of 04/19/2024 - Reviewed 04/19/2024   Allergen Reaction Noted    Adhesive [medical tape]  10/19/2020    Penicillins Other (See Comments) 12/18/2012    Suture  10/19/2020    Sulfa antibiotics Rash 10/19/2020            The following portions of the patient's history were reviewed and updated as appropriate: allergies, current medications, past family history, past medical history, past social history, past surgical history and problem list.     Past Medical History:   Diagnosis Date    Abnormal colonoscopy     Anemia     acute blood loss anemia    Basal cell carcinoma (BCC)     Breast cancer (HCC) 09/18/2020    right breast    Colon polyp     Hiatal hernia     Hyperlipidemia     Melanoma in situ (HCC)     Squamous cell carcinoma in situ        Past Surgical History:   Procedure Laterality Date    BREAST BIOPSY Right 09/18/2020    2 sites; DCIS    COLONOSCOPY      EGD      HYSTERECTOMY  2013    INCONTINENCE SURGERY      LEFORT I OSTEOTOMY / CRANIOTOMY Bilateral     MANDIBLE SURGERY      MASTECTOMY Right 10/14/2020    TUBAL LIGATION         Family History   Problem Relation Age of Onset    No Known Problems Mother     No Known Problems Father     No Known Problems Sister     No Known Problems Daughter     No Known Problems Maternal Grandmother     Multiple myeloma Maternal Grandfather     No Known  "Problems Paternal Grandmother     No Known Problems Paternal Grandfather     Breast cancer Paternal Aunt         40s    Breast cancer Maternal Aunt 50    Breast cancer Maternal Aunt 70    No Known Problems Maternal Aunt     BRCA2 Positive Neg Hx     BRCA2 Negative Neg Hx     BRCA1 Positive Neg Hx     BRCA1 Negative Neg Hx     BRCA 1/2 Neg Hx     Ovarian cancer Neg Hx     Endometrial cancer Neg Hx     Colon cancer Neg Hx     Breast cancer additional onset Neg Hx          Medications have been verified.        Objective   /64   Pulse 70   Temp 97.5 °F (36.4 °C)   Resp 18   Ht 5' 4\" (1.626 m)   Wt 79.4 kg (175 lb)   SpO2 97%   BMI 30.04 kg/m²   No LMP recorded. Patient has had a hysterectomy.       Physical Exam     Physical Exam  Vitals and nursing note reviewed.   Constitutional:       General: She is not in acute distress.     Appearance: She is not toxic-appearing.   Eyes:      Conjunctiva/sclera: Conjunctivae normal.   Pulmonary:      Effort: Pulmonary effort is normal.   Musculoskeletal:         General: Swelling and tenderness present.      Right elbow: Normal.      Right forearm: Normal.      Right wrist: Swelling, tenderness, bony tenderness and snuff box tenderness present. Decreased range of motion. Normal pulse.      Right hand: Normal. Normal strength. Normal sensation.      Comments: TTP over radial aspect of right wrist with associated swelling, no ecchymosis or erythema. Reduced ROM, pain with flexion, extension, and lateral/medial deviation. NV intact.    Skin:     General: Skin is warm and dry.   Neurological:      Mental Status: She is alert and oriented to person, place, and time.      Sensory: Sensation is intact.      Motor: Motor function is intact.      Gait: Gait is intact.   Psychiatric:         Mood and Affect: Mood normal.         Behavior: Behavior normal.                   "

## 2024-04-19 NOTE — PATIENT INSTRUCTIONS
Preliminary XR read concerning for fracture, final read pending  Rest, Ice, Compression, and Elevation  OTC Tylenol/Ibuprofen for pain  Wrist brace   Referral placed to Orthopedic Surgery   Follow up with PCP in 3-5 days.  Proceed to  ER if symptoms worsen.    If tests have been performed at [facility], our office will contact you with results if changes need to be made to the care plan discussed with you at the visit.  You can review your full results on [facility] MyChart.    Wrist Fracture in Adults   AMBULATORY CARE:   A wrist fracture  is a break in one or more of the bones in your wrist.        Signs and symptoms:   Pain, swelling, and bruising of your injured wrist    Wrist pain that is worse when you hold something or put pressure on your wrist    Weakness, numbness, or tingling in your injured hand or wrist    Trouble moving your wrist, hand, or fingers    A change in the shape of your wrist    Seek care immediately if:   Your pain gets worse or does not get better after you take pain medicine.    Your cast or splint breaks, gets wet, or is damaged.    Your hand or fingers feel numb or cold.    Your hand or fingers turn white or blue.    Your splint or cast feels too tight.    You have more pain or swelling after the cast or splint is put on.    Call your doctor if:   You have a fever.    There is a foul smell or blood coming from under the cast.    You have questions or concerns about your condition or care.    Treatment for a wrist fracture  will depend on which wrist bone was broken and the kind of fracture you have. You may need any of the following:  Medicine  may be given to decrease pain and swelling. You may need antibiotic medicine or a tetanus shot if there is a break in your skin.    A cast, splint, or brace  may be placed on your wrist to decrease movement. These devices will help hold the bones in place while they heal.    Traction  may be needed if your bone broke into 2 pieces. Traction pulls  on the bone pieces to pull them back into place. A pin may be put in your bone or cast and hooked to ropes and a pulley. Weight is hung on the rope to help pull on the bones so they will heal correctly.    A closed reduction  is a procedure to put your bones into the correct position without surgery.    Surgery  may be needed to put your bones back into the correct position. Wires, pins, plates or screws may be used to help hold the bones in place.    Self-care:   Rest  as much as possible. Do not play contact sports until the healthcare provider says it is okay.    Apply ice  on your wrist for 15 to 20 minutes every hour or as directed. Use an ice pack, or put crushed ice in a plastic bag. Cover it with a towel before you place it on your skin. Ice helps prevent tissue damage and decreases swelling and pain.    Elevate  your wrist above the level of your heart as often as possible. This will help decrease swelling and pain. Prop your wrist on pillows or blankets to keep it elevated comfortably.       Cast or splint care:   You may take a bath or shower as directed. Do not let your cast or splint get wet. Before bathing, cover the cast or splint with 2 plastic trash bags. Tape the bags to your skin above the cast or splint to seal out the water. Keep your arm out of the water in case the bag breaks. If a plaster cast gets wet and soft, call your healthcare provider.    Check the skin around the cast or splint every day. You may put lotion on any red or sore areas.    Do not push down or lean on the cast or brace because it may break.    Do not  scratch the skin under the cast by putting a sharp or pointed object inside the cast.    Go to physical therapy as directed:  You may need physical therapy after your wrist heals and the cast is removed. A physical therapist can teach you exercises to help improve movement and strength and to decrease pain.  Follow up with your doctor or bone specialist as directed:  You may  need to return to have your cast removed. You may also need an x-ray to check how well the bone has healed. Write down your questions so you remember to ask them during your visits.  © Copyright [employer] 2023 Information is for End User's use only and may not be sold, redistributed or otherwise used for commercial purposes.  The above information is an  only. It is not intended as medical advice for individual conditions or treatments. Talk to your doctor, nurse or pharmacist before following any medical regimen to see if it is safe and effective for you.

## 2024-04-22 ENCOUNTER — OFFICE VISIT (OUTPATIENT)
Dept: OBGYN CLINIC | Facility: CLINIC | Age: 69
End: 2024-04-22
Payer: MEDICARE

## 2024-04-22 VITALS
HEART RATE: 69 BPM | SYSTOLIC BLOOD PRESSURE: 120 MMHG | DIASTOLIC BLOOD PRESSURE: 80 MMHG | TEMPERATURE: 97.7 F | WEIGHT: 173 LBS | HEIGHT: 64 IN | BODY MASS INDEX: 29.53 KG/M2

## 2024-04-22 DIAGNOSIS — S52.501A CLOSED FRACTURE OF DISTAL END OF RIGHT RADIUS, UNSPECIFIED FRACTURE MORPHOLOGY, INITIAL ENCOUNTER: ICD-10-CM

## 2024-04-22 PROCEDURE — 99204 OFFICE O/P NEW MOD 45 MIN: CPT | Performed by: ORTHOPAEDIC SURGERY

## 2024-04-22 PROCEDURE — 25600 CLTX DST RDL FX/EPHYS SEP WO: CPT | Performed by: PHYSICIAN ASSISTANT

## 2024-04-22 NOTE — PATIENT INSTRUCTIONS
Patient is to be nonweightbearing through the right wrist.  She has a 5 pound lifting restriction with the right hand.  May continue with ice and elevation as well as over-the-counter Tylenol as needed for pain control and swelling/ecchymosis.  She will be seen back in the orthopedic office in 1 week for repeat x-rays of the right wrist out of the Exos brace.  She is placed in an Exos brace today in the office locked in place.  She is to keep this clean dry and intact.

## 2024-04-22 NOTE — PROGRESS NOTES
69 y.o.female presents for initial orthopedic evaluation of right wrist injury.  She is right-hand dominant and reports that on Friday, April 19 she was weed whacking and backing up causing her to trip and fall on an outstretched hand.  She noted pain about the right wrist and went to an urgent care then 1-1/2 to 2 hours of the injury.  They did x-rays and placed the patient in a wrist brace.  She presents today for follow-up.  She notes the swelling has improved some.  She notes the pain is tolerable.  Denies gross paresthesias in the hand.  She denies any forearm or elbow or shoulder pain.  She states that she recalls having some sprains of the wrist as a child but does not recall any history of fractures.  She denies any recent injuries.    Review of Systems  Review of systems negative unless otherwise specified in HPI    Past Medical History  Past Medical History:   Diagnosis Date    Abnormal colonoscopy     Anemia     acute blood loss anemia    Basal cell carcinoma (BCC)     Breast cancer (HCC) 09/18/2020    right breast    Colon polyp     Hiatal hernia     Hyperlipidemia     Melanoma in situ (HCC)     Squamous cell carcinoma in situ      Past Surgical History  Past Surgical History:   Procedure Laterality Date    BREAST BIOPSY Right 09/18/2020    2 sites; DCIS    COLONOSCOPY      EGD      HYSTERECTOMY  2013    INCONTINENCE SURGERY      LEFORT I OSTEOTOMY / CRANIOTOMY Bilateral     MANDIBLE SURGERY      MASTECTOMY Right 10/14/2020    TUBAL LIGATION       Current Medications  Current Outpatient Medications on File Prior to Visit   Medication Sig Dispense Refill    Coenzyme Q10 (CoQ10) 100 MG CAPS Take by mouth      ibuprofen (MOTRIN) 600 mg tablet Take 600 mg by mouth as needed      Multiple Vitamins-Iron TABS Take by mouth      rosuvastatin (CRESTOR) 10 MG tablet Take 10 mg by mouth 3 (three) times a week        Vitamin D, Cholecalciferol, 25 MCG (1000 UT) TABS Take by mouth      zoledronic acid (RECLAST) 5  mg/100 mL IV infusion (premix) Inject 5 mg into a catheter in a vein      [DISCONTINUED] Black Pepper-Turmeric 3-500 MG CAPS Take by mouth       No current facility-administered medications on file prior to visit.     Recent Labs (HCT,HGB,PT,INR,ESR,CRP,GLU,HgA1C)  0   Lab Value Date/Time    HCT 46.6 (H) 11/30/2023 0947    HGB 14.8 11/30/2023 0947    WBC 5.98 11/30/2023 0947    INR 0.88 05/10/2022 1539    HGBA1C 6.0 (H) 11/30/2023 0947     Physical exam  Body mass index is 29.7 kg/m².  General: Awake, Alert, Oriented  Eyes: Pupils equal, round and reactive to light  Heart: regular rate and rhythm  Lungs: No audible wheezing  Abdomen: soft  right wrist is with wrist brace.  The wrist brace was removed and there is ecchymosis over the distal aspect of the ulnar shaft the medial side of the wrist and the volar aspect of the wrist notes moderate ecchymosis as well.  There are no open wounds.  There is minimal swelling.  Light touch sensation is intact through all fingers.  No pain with palpation over the shoulder, elbow or proximal forearm area.  There is discrete tenderness with palpation over the distal radius on the volar aspect and dorsally at the distal radius articular surface.  There is also lesser degree tenderness with palpation over the tip of the ulnar styloid area. She is able oppose her thumb to all fingers.  She is able to flex and extend the fingers.  Supination is approximately 70 degrees with full pronation.  There is decreased wrist flexion extension approximately 15 degrees either direction as expected.  Clear refill is brisk.  Radial pulse 2/4.    Procedure  Placement of Exos brace by MA in office.      Imaging  Wrist x-rays from 4/19/2024 were reviewed and demonstrate a fracture of the distal radius without significant displacement or angulation and a fracture of the very distal aspect of the ulnar styloid.    X-ray report was reviewed.    The [facility] note was reviewed.    Assessment:    1.  Closed fracture of distal end of right radius, unspecified fracture morphology, initial encounter      Plan: The treatment options were discussed.  An Exos cast was applied and locked in position.  I will see her in 1 week for reevaluation with x-rays of the right wrist out of the Exos cast including AP and lateral views.  Precautions have been reviewed, instructions provided and questions answered.  She is to contact me if questions or concerns arise.    Fracture / Dislocation Treatment    Date/Time: 4/22/2024 10:45 AM    Performed by: Mariusz Horton PA-C  Authorized by: Mariusz Horton PA-C  Universal Protocol:  Consent: Verbal consent obtained.  Consent given by: patient  Timeout called at: 4/22/2024 11:12 AM.  Patient understanding: patient states understanding of the procedure being performed  Site marked: the operative site was marked    Injury location:  Wrist  Location details:  Right wrist  Injury type:  Fracture  Fracture type: distal radius and ulnar styloid    Fracture type: distal radius and ulnar styloid    Neurovascular status: Neurovascularly intact    Manipulation performed?: No    Immobilization:  Splint  Splint type:  Short arm splint, dynamic        This note was created with voice recognition software.

## 2024-04-24 ENCOUNTER — TELEPHONE (OUTPATIENT)
Dept: OBGYN CLINIC | Facility: CLINIC | Age: 69
End: 2024-04-24

## 2024-04-24 NOTE — TELEPHONE ENCOUNTER
Pt is here now. She just stopped in, stating that the exos brace which is locked is irritating her. Feels wet and damp inside from sweating. And it is really bothering her

## 2024-04-29 ENCOUNTER — HOSPITAL ENCOUNTER (OUTPATIENT)
Dept: RADIOLOGY | Facility: CLINIC | Age: 69
Discharge: HOME | End: 2024-04-29
Payer: MEDICARE

## 2024-04-29 ENCOUNTER — OFFICE VISIT (OUTPATIENT)
Dept: OBGYN CLINIC | Facility: CLINIC | Age: 69
End: 2024-04-29
Payer: MEDICARE

## 2024-04-29 VITALS
SYSTOLIC BLOOD PRESSURE: 140 MMHG | DIASTOLIC BLOOD PRESSURE: 85 MMHG | TEMPERATURE: 98.5 F | HEART RATE: 88 BPM | HEIGHT: 64 IN | BODY MASS INDEX: 29.53 KG/M2 | WEIGHT: 173 LBS

## 2024-04-29 DIAGNOSIS — S52.501D CLOSED FRACTURE OF DISTAL END OF RIGHT RADIUS WITH ROUTINE HEALING, UNSPECIFIED FRACTURE MORPHOLOGY, SUBSEQUENT ENCOUNTER: Primary | ICD-10-CM

## 2024-04-29 DIAGNOSIS — S52.501A CLOSED FRACTURE OF DISTAL END OF RIGHT RADIUS, UNSPECIFIED FRACTURE MORPHOLOGY, INITIAL ENCOUNTER: ICD-10-CM

## 2024-04-29 PROCEDURE — 29075 APPL CST ELBW FNGR SHORT ARM: CPT | Performed by: PHYSICIAN ASSISTANT

## 2024-04-29 PROCEDURE — 73100 X-RAY EXAM OF WRIST: CPT

## 2024-04-29 PROCEDURE — 99024 POSTOP FOLLOW-UP VISIT: CPT | Performed by: ORTHOPAEDIC SURGERY

## 2024-04-29 NOTE — PATIENT INSTRUCTIONS
Due to intolerance of the Exos brace she was given the option of a short arm cast which she elected.  She was told that this could cause some sweating and malodor as well but should not be any irritation to the skin as there is stockinette and cotton padding.  She was agreeable to this.  Patient will be seen back in 3 weeks for cast off x-ray.  Cast care instructions provided.  Cast must be kept clean and dry.

## 2024-04-29 NOTE — PROGRESS NOTES
"Patient Name:  Faiza Gonzalez  MRN:  [id number]    Assessment     1. Closed fracture of distal end of right radius with routine healing, unspecified fracture morphology, subsequent encounter  XR wrist 3+ vw right        Plan     Due to intolerance of the Exos brace she was given the option of a short arm cast which she elected.  She was told that this could cause some sweating and malodor as well but should not be any irritation to the skin as there is stockinette and cotton padding.  She was agreeable to this.  Patient will be seen back in 3 weeks for cast off x-ray.  Cast care instructions provided.  Cast must be kept clean and dry.    Return in about 3 weeks (around 5/20/2024) for Recheck cast off x-ray.    Subjective   Faiza Gonzalez returns for follow-up of dominant right distal radius fracture, DOI 4/19/2024.  The patient is 10 day(s) post injury and returns for routine follow-up and x-ray out of her Exos cast. Patient complains of sweating and malodor slight skin reaction from using the Exos brace.  Much prefer to go into the small wrist brace that has a cloth lining as opposed to the foam lining.  She reports a history of allergies to certain tapes and Vicryl suture.  She states that her palm was hypersensitive bite no gross visual findings.  As far as pain about the wrist she notes some pain over the distal radius but also notes some pain over the distal ulnar area which is an area of ecchymosis.  She denies any numbness or tingling.    Objective     /85   Pulse 88   Temp 98.5 °F (36.9 °C)   Ht 5' 4\" (1.626 m)   Wt 78.5 kg (173 lb)   BMI 29.70 kg/m²     Right wrist notes ecchymosis over the distal ulnar shaft area and faint ecchymosis over the volar aspect of the distal radius a much lesser degree.  There is no skin breakdown or obvious skin rash noted.  She has point tender with AP compression of the distal radius.  She has no significant tenderness with palpation to the tip of the ulnar styloid " area.  She is essentially pain-free full pronation and to 85 degrees of supination.  She is able oppose her thumb to all digits and extend the thumb without pain.  There is no pain with palpation at the elbow.  Light touch sensation is intact.  Radial pulses intact.  Capillary refill is brisk.  No gross bony deformity.    Cast application    Date/Time: 4/29/2024 1:45 PM    Performed by: Mariusz Horton PA-C  Authorized by: Mariusz Horton PA-C  Universal Protocol:  Consent: Verbal consent obtained.  Risks and benefits: risks, benefits and alternatives were discussed  Consent given by: patient  Timeout called at: 4/29/2024 2:20 PM.  Patient understanding: patient states understanding of the procedure being performed  Site marked: the operative site was marked  Patient identity confirmed: verbally with patient    Procedure details:     Laterality:  Right    Location:  Wrist    Wrist:  R wrist    Strapping: no  Cast type:  Short arm        Supplies:  Fiberglass and cotton padding    Data Review     I have personally reviewed pertinent films in PACS documenting maintained position/alignment of the distal radius fracture without any significant shortening and tiny fracture at the tip of the ulnar styloid also unchanged position.    Mariusz Horton PA-C

## 2024-05-02 ENCOUNTER — APPOINTMENT (OUTPATIENT)
Dept: LAB | Facility: HOSPITAL | Age: 69
End: 2024-05-02
Payer: MEDICARE

## 2024-05-02 DIAGNOSIS — D68.59 THROMBOPHILIA (HCC): ICD-10-CM

## 2024-05-02 DIAGNOSIS — E55.9 VITAMIN D DEFICIENCY: ICD-10-CM

## 2024-05-02 DIAGNOSIS — E78.2 MIXED HYPERLIPIDEMIA: ICD-10-CM

## 2024-05-02 DIAGNOSIS — R73.01 ELEVATED FASTING BLOOD SUGAR: ICD-10-CM

## 2024-05-02 DIAGNOSIS — D64.9 ANEMIA, UNSPECIFIED TYPE: ICD-10-CM

## 2024-05-02 LAB
25(OH)D3 SERPL-MCNC: 56.4 NG/ML (ref 30–100)
BASOPHILS # BLD AUTO: 0.05 THOUSANDS/ÂΜL (ref 0–0.1)
BASOPHILS NFR BLD AUTO: 1 % (ref 0–1)
CHOLEST SERPL-MCNC: 228 MG/DL
EOSINOPHIL # BLD AUTO: 0.06 THOUSAND/ÂΜL (ref 0–0.61)
EOSINOPHIL NFR BLD AUTO: 1 % (ref 0–6)
ERYTHROCYTE [DISTWIDTH] IN BLOOD BY AUTOMATED COUNT: 12.1 % (ref 11.6–15.1)
EST. AVERAGE GLUCOSE BLD GHB EST-MCNC: 114 MG/DL
HBA1C MFR BLD: 5.6 %
HCT VFR BLD AUTO: 46.2 % (ref 34.8–46.1)
HDLC SERPL-MCNC: 74 MG/DL
HGB BLD-MCNC: 15.1 G/DL (ref 11.5–15.4)
IMM GRANULOCYTES # BLD AUTO: 0.02 THOUSAND/UL (ref 0–0.2)
IMM GRANULOCYTES NFR BLD AUTO: 0 % (ref 0–2)
LDLC SERPL CALC-MCNC: 139 MG/DL (ref 0–100)
LYMPHOCYTES # BLD AUTO: 1.3 THOUSANDS/ÂΜL (ref 0.6–4.47)
LYMPHOCYTES NFR BLD AUTO: 23 % (ref 14–44)
MCH RBC QN AUTO: 29.2 PG (ref 26.8–34.3)
MCHC RBC AUTO-ENTMCNC: 32.7 G/DL (ref 31.4–37.4)
MCV RBC AUTO: 89 FL (ref 82–98)
MONOCYTES # BLD AUTO: 0.65 THOUSAND/ÂΜL (ref 0.17–1.22)
MONOCYTES NFR BLD AUTO: 12 % (ref 4–12)
NEUTROPHILS # BLD AUTO: 3.47 THOUSANDS/ÂΜL (ref 1.85–7.62)
NEUTS SEG NFR BLD AUTO: 63 % (ref 43–75)
NONHDLC SERPL-MCNC: 154 MG/DL
NRBC BLD AUTO-RTO: 0 /100 WBCS
PLATELET # BLD AUTO: 316 THOUSANDS/UL (ref 149–390)
PMV BLD AUTO: 9.6 FL (ref 8.9–12.7)
RBC # BLD AUTO: 5.17 MILLION/UL (ref 3.81–5.12)
TRIGL SERPL-MCNC: 73 MG/DL
WBC # BLD AUTO: 5.55 THOUSAND/UL (ref 4.31–10.16)

## 2024-05-02 PROCEDURE — 82306 VITAMIN D 25 HYDROXY: CPT

## 2024-05-02 PROCEDURE — 83036 HEMOGLOBIN GLYCOSYLATED A1C: CPT

## 2024-05-02 PROCEDURE — 85025 COMPLETE CBC W/AUTO DIFF WBC: CPT

## 2024-05-02 PROCEDURE — 80053 COMPREHEN METABOLIC PANEL: CPT

## 2024-05-02 PROCEDURE — 80061 LIPID PANEL: CPT

## 2024-05-02 PROCEDURE — 36415 COLL VENOUS BLD VENIPUNCTURE: CPT

## 2024-05-08 LAB
ALBUMIN SERPL BCP-MCNC: 4.6 G/DL (ref 3.5–5)
ALP SERPL-CCNC: 93 U/L (ref 34–104)
ALT SERPL W P-5'-P-CCNC: 18 U/L (ref 7–52)
ANION GAP SERPL CALCULATED.3IONS-SCNC: 8 MMOL/L (ref 4–13)
AST SERPL W P-5'-P-CCNC: 19 U/L (ref 13–39)
BILIRUB SERPL-MCNC: 0.53 MG/DL (ref 0.2–1)
BUN SERPL-MCNC: 17 MG/DL (ref 5–25)
CALCIUM SERPL-MCNC: 9.9 MG/DL (ref 8.4–10.2)
CHLORIDE SERPL-SCNC: 102 MMOL/L (ref 96–108)
CO2 SERPL-SCNC: 29 MMOL/L (ref 21–32)
CREAT SERPL-MCNC: 0.81 MG/DL (ref 0.6–1.3)
GFR SERPL CREATININE-BSD FRML MDRD: 74 ML/MIN/1.73SQ M
GLUCOSE P FAST SERPL-MCNC: 101 MG/DL (ref 65–99)
POTASSIUM SERPL-SCNC: 3.8 MMOL/L (ref 3.5–5.3)
PROT SERPL-MCNC: 7.7 G/DL (ref 6.4–8.4)
SODIUM SERPL-SCNC: 139 MMOL/L (ref 135–147)

## 2024-05-20 ENCOUNTER — OFFICE VISIT (OUTPATIENT)
Dept: OBGYN CLINIC | Facility: CLINIC | Age: 69
End: 2024-05-20

## 2024-05-20 ENCOUNTER — HOSPITAL ENCOUNTER (OUTPATIENT)
Dept: RADIOLOGY | Facility: CLINIC | Age: 69
Discharge: HOME | End: 2024-05-20
Payer: MEDICARE

## 2024-05-20 VITALS
HEIGHT: 64 IN | HEART RATE: 77 BPM | DIASTOLIC BLOOD PRESSURE: 80 MMHG | WEIGHT: 173 LBS | TEMPERATURE: 97.9 F | SYSTOLIC BLOOD PRESSURE: 128 MMHG | BODY MASS INDEX: 29.53 KG/M2

## 2024-05-20 DIAGNOSIS — S52.501D CLOSED FRACTURE OF DISTAL END OF RIGHT RADIUS WITH ROUTINE HEALING, UNSPECIFIED FRACTURE MORPHOLOGY, SUBSEQUENT ENCOUNTER: Primary | ICD-10-CM

## 2024-05-20 DIAGNOSIS — S52.501D CLOSED FRACTURE OF DISTAL END OF RIGHT RADIUS WITH ROUTINE HEALING, UNSPECIFIED FRACTURE MORPHOLOGY, SUBSEQUENT ENCOUNTER: ICD-10-CM

## 2024-05-20 PROCEDURE — 99024 POSTOP FOLLOW-UP VISIT: CPT | Performed by: ORTHOPAEDIC SURGERY

## 2024-05-20 PROCEDURE — 73110 X-RAY EXAM OF WRIST: CPT

## 2024-05-20 NOTE — PROGRESS NOTES
ASSESSMENT/PLAN:    Diagnoses and all orders for this visit:    Closed fracture of distal end of right radius with routine healing, unspecified fracture morphology, subsequent encounter  -     Cancel: XR wrist 2 vw right; Future        General Discussions:  ***    Operative Discussions:  ***    _____________________________________________________  CHIEF COMPLAINT:  Chief Complaint   Patient presents with    Right Wrist - Follow-up    Follow-up         SUBJECTIVE:  Faiza Gonzalez is a 69 y.o. year old female who presents for follow up of dominant right distal radius fracture, DOI 4/19/2024.  The patient is 4 weeks status post injury and returns for routine follow-up.    PAST MEDICAL HISTORY:  Past Medical History:   Diagnosis Date    Abnormal colonoscopy     Anemia     acute blood loss anemia    Basal cell carcinoma (BCC)     Breast cancer (HCC) 09/18/2020    right breast    Colon polyp     Hiatal hernia     Hyperlipidemia     Melanoma in situ (HCC)     Squamous cell carcinoma in situ        PAST SURGICAL HISTORY:  Past Surgical History:   Procedure Laterality Date    BREAST BIOPSY Right 09/18/2020    2 sites; DCIS    COLONOSCOPY      EGD      HYSTERECTOMY  2013    INCONTINENCE SURGERY      LEFORT I OSTEOTOMY / CRANIOTOMY Bilateral     MANDIBLE SURGERY      MASTECTOMY Right 10/14/2020    TUBAL LIGATION         FAMILY HISTORY:  Family History   Problem Relation Age of Onset    No Known Problems Mother     No Known Problems Father     No Known Problems Sister     No Known Problems Daughter     No Known Problems Maternal Grandmother     Multiple myeloma Maternal Grandfather     No Known Problems Paternal Grandmother     No Known Problems Paternal Grandfather     Breast cancer Paternal Aunt         40s    Breast cancer Maternal Aunt 50    Breast cancer Maternal Aunt 70    No Known Problems Maternal Aunt     BRCA2 Positive Neg Hx     BRCA2 Negative Neg Hx     BRCA1 Positive Neg Hx     BRCA1 Negative Neg Hx     BRCA 1/2  "Neg Hx     Ovarian cancer Neg Hx     Endometrial cancer Neg Hx     Colon cancer Neg Hx     Breast cancer additional onset Neg Hx        SOCIAL HISTORY:  Social History     Tobacco Use    Smoking status: Never    Smokeless tobacco: Never   Vaping Use    Vaping status: Never Used   Substance Use Topics    Alcohol use: Not Currently     Comment: wine occasionally    Drug use: Never       MEDICATIONS:    Current Outpatient Medications:     Coenzyme Q10 (CoQ10) 100 MG CAPS, Take by mouth, Disp: , Rfl:     ibuprofen (MOTRIN) 600 mg tablet, Take 600 mg by mouth as needed, Disp: , Rfl:     Multiple Vitamins-Iron TABS, Take by mouth, Disp: , Rfl:     rosuvastatin (CRESTOR) 10 MG tablet, Take 10 mg by mouth 3 (three) times a week  , Disp: , Rfl:     Vitamin D, Cholecalciferol, 25 MCG (1000 UT) TABS, Take by mouth, Disp: , Rfl:     zoledronic acid (RECLAST) 5 mg/100 mL IV infusion (premix), Inject 5 mg into a catheter in a vein, Disp: , Rfl:     ALLERGIES:  Allergies   Allergen Reactions    Adhesive [Medical Tape]     Suture      Vicryl brand    Sulfa Antibiotics Rash       REVIEW OF SYSTEMS:  {REVIEWOFSYSTEMS:50628::\"Pertinent items are noted in HPI.\",\"A comprehensive review of systems was negative.\"}      _____________________________________________________  PHYSICAL EXAMINATION:  General: {1234:15739::\"well developed and well nourished\",\"alert\",\"oriented times 3\",\"appears comfortable\"}  Cardiovascular:***  Pulmonary: No wheezing or stridor  Skin: {Skin exam:82590}  Neurovascular: ***    MUSCULOSKELETAL EXAMINATION:  {Side Examined:18469}    _____________________________________________________  STUDIES REVIEWED:  Images were reviewed in PACS by Dr. Gonzalez and demonstrate: ***          Camilla Ramirez PA-C    "

## 2024-05-20 NOTE — PROGRESS NOTES
"Patient Name:  Faiza Gonzalez  MRN:  [id number]    Assessment     1. Closed fracture of distal end of right radius with routine healing, unspecified fracture morphology, subsequent encounter  CANCELED: XR wrist 2 vw right          Plan     I would recommend follow-up in 2 to 3 weeks.  She is to work on a home exercise program as reviewed with her.  She did not feel the need to attend physical therapy.  However, if she finds she is not pursuing the home exercise program routinely, she will contact the office and I will write for therapy.  I have encouraged her to contact me if any questions or concerns arise.    Return for 2 to 3 weeks.      Subjective   Faiza Gonzalez returns for follow-up of her distal right radius fracture.  The patient is 4 week(s) post injury and returns for routine follow-up. Patient denies pain upon arrival and with her cast in place.  After cast removal, she noted some mild discomfort and stiffness.      Objective     /80   Pulse 77   Temp 97.9 °F (36.6 °C)   Ht 5' 4\" (1.626 m)   Wt 78.5 kg (173 lb)   BMI 29.70 kg/m²     Exam demonstrated the cast in place upon arrival.  This was removed without difficulty.  She has no tenderness to palpation of the distal radius or ulna.  She has only about 10 degrees of dorsiflexion and 5 degrees of volar flexion complaining of pain with volar flexion.  The skin is intact.  Pulses are palpable.  Good color and capillary refill is noted.      Data Review     I have personally reviewed pertinent films in PACS demonstrating good progression of healing of the fracture.    Jeff Gonzalez      "

## 2024-06-13 ENCOUNTER — OFFICE VISIT (OUTPATIENT)
Dept: OBGYN CLINIC | Facility: CLINIC | Age: 69
End: 2024-06-13

## 2024-06-13 VITALS
SYSTOLIC BLOOD PRESSURE: 120 MMHG | OXYGEN SATURATION: 96 % | WEIGHT: 173 LBS | HEIGHT: 64 IN | BODY MASS INDEX: 29.53 KG/M2 | TEMPERATURE: 97.6 F | HEART RATE: 78 BPM | DIASTOLIC BLOOD PRESSURE: 70 MMHG

## 2024-06-13 DIAGNOSIS — S52.501D CLOSED FRACTURE OF DISTAL END OF RIGHT RADIUS WITH ROUTINE HEALING, UNSPECIFIED FRACTURE MORPHOLOGY, SUBSEQUENT ENCOUNTER: Primary | ICD-10-CM

## 2024-06-13 PROCEDURE — 99024 POSTOP FOLLOW-UP VISIT: CPT | Performed by: ORTHOPAEDIC SURGERY

## 2024-06-13 NOTE — PATIENT INSTRUCTIONS
Patient will follow-up in 4 weeks for final clinical check to evaluate her flexion range of motion in particular.  Wear her wrist brace only for extreme lifting.  Otherwise out of the brace and work on range of motion in all planes especially her flexion.

## 2024-06-13 NOTE — PROGRESS NOTES
"Patient Name:  Faiza Gonzalez  MRN:  [id number]    Assessment     1. Closed fracture of distal end of right radius with routine healing, unspecified fracture morphology, subsequent encounter          Plan     Patient will follow-up in 4 weeks for final clinical check to evaluate her flexion range of motion in particular.  Wear her wrist brace only for extreme lifting.  Otherwise out of the brace and work on range of motion in all planes especially her flexion.    Return in about 4 weeks (around 7/11/2024) for Recheck.    Subjective   Faiza Gonzalez returns for follow-up of mid arm right distal radius fracture, DOI 4/19/2024.  The patient is 7 week(s) post injury and returns for routine follow-up. Patient complains of rare soreness about the wrist.  She notes that her motion is improving but still has a little bit of difficulty with her flexion.  She denies any numbness or tingling.  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  She did have 1 rare occasion in the evening after working outside of some dorsal hand pain resolved the next day.  He is an early pleased with her outcome.    Objective     /70   Pulse 78   Temp 97.6 °F (36.4 °C) (Temporal)   Ht 5' 4\" (1.626 m)   Wt 78.5 kg (173 lb)   SpO2 96%   BMI 29.70 kg/m²     Right wrist is no gross swelling or bony deformity.  There is very faint discomfort with palpation dorsally over the distal radius.  She has 85 degrees of supination and full pronation.  She has grossly full extension but lacks the last 10 degrees of flexion.  She has grossly symmetric ulnar deviation but lacks the last 2 or 3 degrees of radial deviation compared contralaterally.  Strength is plus/5.  Light touch sensation is intact radial pulses intact.  Capillary refill is brisk.    Data Review     No pertinent data for review today.    Mariusz Horton PA-C     "

## 2024-07-11 ENCOUNTER — OFFICE VISIT (OUTPATIENT)
Dept: OBGYN CLINIC | Facility: CLINIC | Age: 69
End: 2024-07-11

## 2024-07-11 VITALS
TEMPERATURE: 97.6 F | DIASTOLIC BLOOD PRESSURE: 80 MMHG | BODY MASS INDEX: 29.53 KG/M2 | HEIGHT: 64 IN | HEART RATE: 84 BPM | SYSTOLIC BLOOD PRESSURE: 123 MMHG | WEIGHT: 173 LBS | OXYGEN SATURATION: 96 %

## 2024-07-11 DIAGNOSIS — S52.501D CLOSED FRACTURE OF DISTAL END OF RIGHT RADIUS WITH ROUTINE HEALING, UNSPECIFIED FRACTURE MORPHOLOGY, SUBSEQUENT ENCOUNTER: Primary | ICD-10-CM

## 2024-07-11 PROCEDURE — 99024 POSTOP FOLLOW-UP VISIT: CPT | Performed by: ORTHOPAEDIC SURGERY

## 2024-07-11 NOTE — PROGRESS NOTES
"Patient Name:  Faiza Gonzalez  MRN:  [id number]    Assessment     1. Closed fracture of distal end of right radius with routine healing, unspecified fracture morphology, subsequent encounter          Plan     Patient can be discharged from care and continue to work on her range of motion.  She will follow-up on an as-needed basis only.  All questions were answered and she is satisfied with her outcome.    Return if symptoms worsen or fail to improve.    Subjective   Faiza Gonzalez returns for follow-up of right distal radius fracture DOI 4/19/24.  The patient is 11 week(s) post injury and returns for routine follow-up for clinical evaluation. Patient complains of hair soreness.  She notes in general she is pleased with her outcome having increased strength and range of motion.  Denies numbness or tingling.    Objective     /80 (BP Location: Left arm)   Pulse 84   Temp 97.6 °F (36.4 °C)   Ht 5' 4\" (1.626 m)   Wt 78.5 kg (173 lb)   SpO2 96%   BMI 29.70 kg/m²     Right wrist is without gross bony deformity.  There is no tenderness with palpation over the distal radius.  She has approximately a 5 degree lag on her radial deviation and on her wrist flexion.  Otherwise range of motion is grossly symmetric to the contralateral side.   strength is symmetric.  He has full pronation and supination without pain or loss of motion.  Light touch sensation is intact.    Data Review   No pertinent data for review today.    Mariusz Horton PA-C     "

## 2024-07-11 NOTE — PATIENT INSTRUCTIONS
Patient can be discharged from care and continue to work on her range of motion.  She will follow-up on an as-needed basis only.  All questions were answered and she is satisfied with her outcome.

## 2024-10-11 ENCOUNTER — APPOINTMENT (OUTPATIENT)
Dept: LAB | Facility: HOSPITAL | Age: 69
End: 2024-10-11
Payer: MEDICARE

## 2024-10-11 DIAGNOSIS — R73.01 ELEVATED FASTING BLOOD SUGAR: ICD-10-CM

## 2024-10-11 DIAGNOSIS — D68.59 THROMBOPHILIA (HCC): ICD-10-CM

## 2024-10-11 DIAGNOSIS — D64.9 ANEMIA, UNSPECIFIED TYPE: ICD-10-CM

## 2024-10-11 DIAGNOSIS — E78.2 MIXED HYPERLIPIDEMIA: ICD-10-CM

## 2024-10-11 DIAGNOSIS — E55.9 VITAMIN D DEFICIENCY: ICD-10-CM

## 2024-10-11 DIAGNOSIS — M81.0 SENILE OSTEOPOROSIS: ICD-10-CM

## 2024-10-11 LAB
25(OH)D3 SERPL-MCNC: 45.7 NG/ML (ref 30–100)
ALBUMIN SERPL BCG-MCNC: 4.6 G/DL (ref 3.5–5)
ALP SERPL-CCNC: 91 U/L (ref 34–104)
ALT SERPL W P-5'-P-CCNC: 16 U/L (ref 7–52)
ANION GAP SERPL CALCULATED.3IONS-SCNC: 7 MMOL/L (ref 4–13)
AST SERPL W P-5'-P-CCNC: 16 U/L (ref 13–39)
BASOPHILS # BLD AUTO: 0.06 THOUSANDS/ΜL (ref 0–0.1)
BASOPHILS NFR BLD AUTO: 1 % (ref 0–1)
BILIRUB SERPL-MCNC: 0.48 MG/DL (ref 0.2–1)
BUN SERPL-MCNC: 14 MG/DL (ref 5–25)
CALCIUM SERPL-MCNC: 10.1 MG/DL (ref 8.4–10.2)
CHLORIDE SERPL-SCNC: 103 MMOL/L (ref 96–108)
CHOLEST SERPL-MCNC: 195 MG/DL
CO2 SERPL-SCNC: 30 MMOL/L (ref 21–32)
CREAT SERPL-MCNC: 0.76 MG/DL (ref 0.6–1.3)
EOSINOPHIL # BLD AUTO: 0.12 THOUSAND/ΜL (ref 0–0.61)
EOSINOPHIL NFR BLD AUTO: 2 % (ref 0–6)
ERYTHROCYTE [DISTWIDTH] IN BLOOD BY AUTOMATED COUNT: 12.6 % (ref 11.6–15.1)
EST. AVERAGE GLUCOSE BLD GHB EST-MCNC: 128 MG/DL
GFR SERPL CREATININE-BSD FRML MDRD: 80 ML/MIN/1.73SQ M
GLUCOSE P FAST SERPL-MCNC: 97 MG/DL (ref 65–99)
HBA1C MFR BLD: 6.1 %
HCT VFR BLD AUTO: 46 % (ref 34.8–46.1)
HDLC SERPL-MCNC: 73 MG/DL
HGB BLD-MCNC: 14.4 G/DL (ref 11.5–15.4)
IMM GRANULOCYTES # BLD AUTO: 0.02 THOUSAND/UL (ref 0–0.2)
IMM GRANULOCYTES NFR BLD AUTO: 0 % (ref 0–2)
LDLC SERPL CALC-MCNC: 108 MG/DL (ref 0–100)
LYMPHOCYTES # BLD AUTO: 1.29 THOUSANDS/ΜL (ref 0.6–4.47)
LYMPHOCYTES NFR BLD AUTO: 23 % (ref 14–44)
MAGNESIUM SERPL-MCNC: 2.1 MG/DL (ref 1.9–2.7)
MCH RBC QN AUTO: 27.5 PG (ref 26.8–34.3)
MCHC RBC AUTO-ENTMCNC: 31.3 G/DL (ref 31.4–37.4)
MCV RBC AUTO: 88 FL (ref 82–98)
MONOCYTES # BLD AUTO: 0.55 THOUSAND/ΜL (ref 0.17–1.22)
MONOCYTES NFR BLD AUTO: 10 % (ref 4–12)
NEUTROPHILS # BLD AUTO: 3.64 THOUSANDS/ΜL (ref 1.85–7.62)
NEUTS SEG NFR BLD AUTO: 64 % (ref 43–75)
NONHDLC SERPL-MCNC: 122 MG/DL
NRBC BLD AUTO-RTO: 0 /100 WBCS
PHOSPHATE SERPL-MCNC: 3.4 MG/DL (ref 2.3–4.1)
PLATELET # BLD AUTO: 351 THOUSANDS/UL (ref 149–390)
PMV BLD AUTO: 9.6 FL (ref 8.9–12.7)
POTASSIUM SERPL-SCNC: 4.1 MMOL/L (ref 3.5–5.3)
PROT SERPL-MCNC: 7.6 G/DL (ref 6.4–8.4)
RBC # BLD AUTO: 5.24 MILLION/UL (ref 3.81–5.12)
SODIUM SERPL-SCNC: 140 MMOL/L (ref 135–147)
TRIGL SERPL-MCNC: 71 MG/DL
WBC # BLD AUTO: 5.68 THOUSAND/UL (ref 4.31–10.16)

## 2024-10-11 PROCEDURE — 80061 LIPID PANEL: CPT

## 2024-10-11 PROCEDURE — 82306 VITAMIN D 25 HYDROXY: CPT

## 2024-10-11 PROCEDURE — 85025 COMPLETE CBC W/AUTO DIFF WBC: CPT

## 2024-10-11 PROCEDURE — 80053 COMPREHEN METABOLIC PANEL: CPT

## 2024-10-11 PROCEDURE — 83036 HEMOGLOBIN GLYCOSYLATED A1C: CPT

## 2024-10-11 PROCEDURE — 84100 ASSAY OF PHOSPHORUS: CPT

## 2024-10-11 PROCEDURE — 83735 ASSAY OF MAGNESIUM: CPT

## 2024-10-11 PROCEDURE — 36415 COLL VENOUS BLD VENIPUNCTURE: CPT

## 2025-01-22 ENCOUNTER — HOSPITAL ENCOUNTER (OUTPATIENT)
Dept: RADIOLOGY | Facility: CLINIC | Age: 70
Discharge: HOME | End: 2025-01-22
Payer: MEDICARE

## 2025-01-22 VITALS — WEIGHT: 165 LBS | BODY MASS INDEX: 28.17 KG/M2 | HEIGHT: 64 IN

## 2025-01-22 DIAGNOSIS — Z85.3 PERSONAL HISTORY OF MALIGNANT NEOPLASM OF BREAST: ICD-10-CM

## 2025-01-22 PROCEDURE — 77065 DX MAMMO INCL CAD UNI: CPT

## 2025-01-22 PROCEDURE — G0279 TOMOSYNTHESIS, MAMMO: HCPCS

## 2025-02-19 ENCOUNTER — HOSPITAL ENCOUNTER (OUTPATIENT)
Dept: RADIOLOGY | Facility: CLINIC | Age: 70
Discharge: HOME | End: 2025-02-19
Payer: MEDICARE

## 2025-02-19 ENCOUNTER — OFFICE VISIT (OUTPATIENT)
Dept: OBGYN CLINIC | Facility: CLINIC | Age: 70
End: 2025-02-19
Payer: MEDICARE

## 2025-02-19 VITALS — HEIGHT: 64 IN | WEIGHT: 171.4 LBS | BODY MASS INDEX: 29.26 KG/M2

## 2025-02-19 DIAGNOSIS — S67.02XA CRUSHING INJURY OF LEFT THUMB, INITIAL ENCOUNTER: ICD-10-CM
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
## 2025-02-19 DIAGNOSIS — S52.612A TRAUMATIC CLOSED FRACTURE OF ULNAR STYLOID WITH MINIMAL DISPLACEMENT, LEFT, INITIAL ENCOUNTER: ICD-10-CM

## 2025-02-19 DIAGNOSIS — S52.592A OTHER CLOSED FRACTURE OF DISTAL END OF LEFT RADIUS, INITIAL ENCOUNTER: Primary | ICD-10-CM

## 2025-02-19 DIAGNOSIS — M79.642 PAIN OF LEFT HAND: ICD-10-CM

## 2025-02-19 DIAGNOSIS — S62.515A CLOSED NONDISPLACED FRACTURE OF PROXIMAL PHALANX OF LEFT THUMB, INITIAL ENCOUNTER: ICD-10-CM

## 2025-02-19 DIAGNOSIS — M25.532 ACUTE PAIN OF LEFT WRIST: ICD-10-CM

## 2025-02-19 PROCEDURE — 99203 OFFICE O/P NEW LOW 30 MIN: CPT | Performed by: STUDENT IN AN ORGANIZED HEALTH CARE EDUCATION/TRAINING PROGRAM

## 2025-02-19 PROCEDURE — 25600 CLTX DST RDL FX/EPHYS SEP WO: CPT | Performed by: STUDENT IN AN ORGANIZED HEALTH CARE EDUCATION/TRAINING PROGRAM

## 2025-02-19 PROCEDURE — 73110 X-RAY EXAM OF WRIST: CPT

## 2025-02-19 PROCEDURE — 73130 X-RAY EXAM OF HAND: CPT

## 2025-02-19 NOTE — PROGRESS NOTES
1. Other closed fracture of distal end of left radius, initial encounter  Fracture / Dislocation Treatment      2. Pain of left hand  XR hand 3+ vw left    XR wrist 3+ vw left    Fracture / Dislocation Treatment      3. Acute pain of left wrist  XR hand 3+ vw left    XR wrist 3+ vw left    Fracture / Dislocation Treatment      4. Fall, initial encounter  XR hand 3+ vw left    XR wrist 3+ vw left    Fracture / Dislocation Treatment      5. Crushing injury of left thumb, initial encounter        6. Closed nondisplaced fracture of proximal phalanx of left thumb, initial encounter        7. Traumatic closed fracture of ulnar styloid with minimal displacement, left, initial encounter  Fracture / Dislocation Treatment        Orders Placed This Encounter   Procedures    Fracture / Dislocation Treatment    XR hand 3+ vw left    XR wrist 3+ vw left        Imaging Studies (I personally reviewed images in PACS and report):    X-ray left wrist and hand 2/19/2025: Mildly impacted distal radius fracture.  Minimally displaced ulnar styloid fracture.  Subacute first proximal phalanx fracture partially visualized.  No significant degenerative change.    IMPRESSION:  Acute left hand/wrist pain secondary to FOOSH injury  Reports immediate wrist and hand pain along with swelling and bruising along the ulnar aspect of her hand and wrist  Radiographs of wrist showing avulsion distal to ulna styloid, lateral views without smooth contours of dorsal radius - will confirm with radiology interpretation and treat as suspected non-displaced dorsal radius fracture. Clinically non-tender to patient on exam as she feels pain has been improved with use of a wrist brace.  Also reports crushing incident of left thumb about 2 months ago - radiographs noting a healing proximal phalanx fracture of left thumb  Improving with use of universal wrist brace and avoiding any strenuous lifting with left upper extremity  Date of Injury: 2/14/2025  Follow up  "interval: 5 days    PLAN:  Repeat X-ray next visit:   Left wrist  Clinical exam and radiographic imaging reviewed with patient today, with impression as per above. I have discussed with the patient the pathophysiology of this diagnosis and reviewed how the examination correlates with this diagnosis.    Imaging obtained/reviewed as per above. I will follow up official radiology interpretation.  Recommended conservative treatment at this time. Discussed risks/benefits of continued use of universal wrist brace at all times other than for hygienic cleansing vs use of of short arm cast. Patient preferring to continue  use of universal respirator and advised against any strenuous lifting with her left upper extremity prevent further displacement of fracture.  Regards to pain control counseled as the use of acetaminophen, NSAIDs, heat/ice therapy torments on/off.  Separately, I did note the old appearing fracture of the proximal phalanx of her thumb.  There is already healing/callus formation patient is nontender over this aspect of her hand as well.    Return in about 2 weeks (around 3/5/2025).    Portions of the record may have been created with voice recognition software. Occasional wrong word or \"sound a like\" substitutions may have occurred due to the inherent limitations of voice recognition software. Read the chart carefully and recognize, using context, where substitutions have occurred.     CHIEF COMPLAINT:  Chief Complaint   Patient presents with    Left Hand - Pain         HPI:  Faiza Gonzalez is a 70 y.o. female  who presents for       Visit 2/19/2025 :  Initial evaluation of left hand/wrist pain and injury:  Precipitating injury on 2/14/2025 after sustaining a fall on her outstretched left hand.  Patient also notes she had a separate remote injury of her right thumb after a crushing incident 2 months ago.  She states there is bruising and swelling along the base of her thumb at that time as well with limited " range of motion.  She did not seek out medical care or imaging at that time and treated conservatively on her own.  in regards to her left wrist, she noted swelling and bruising with limited range of motion of her wrist.  Described pain as sharp/aching but nonradiating from her wrist.  She has been treating with use of wrist brace which she is currently wearing.  She feels that this intervention has significantly reduced the intensity of her pain and feels is more of a mild aching sensation at this point.  She has been avoiding any strenuous lifting with her left upper extremity.  Denies any N/T of left upper extremity.  Sparing use of Tylenol for pain.  Denies prior surgeries of her left hand or wrist in the past.  She has not had any imaging of her wrist or hand since sustaining either of these injuries.    Medical, Surgical, Family, and Social History    Past Medical History:   Diagnosis Date    Abnormal colonoscopy     Anemia     acute blood loss anemia    Basal cell carcinoma (BCC)     Breast cancer (HCC) 09/18/2020    right breast    Colon polyp     Hiatal hernia     Hyperlipidemia     Melanoma in situ (HCC)     Squamous cell carcinoma in situ      Past Surgical History:   Procedure Laterality Date    BREAST BIOPSY Right 09/18/2020    2 sites; DCIS    COLONOSCOPY      EGD      HYSTERECTOMY  2013    INCONTINENCE SURGERY      LEFORT I OSTEOTOMY / CRANIOTOMY Bilateral     MANDIBLE SURGERY      MASTECTOMY Right 10/14/2020    TUBAL LIGATION       Social History   Social History     Substance and Sexual Activity   Alcohol Use Not Currently    Comment: wine occasionally     Social History     Substance and Sexual Activity   Drug Use Never     Social History     Tobacco Use   Smoking Status Never   Smokeless Tobacco Never     Family History   Problem Relation Age of Onset    No Known Problems Mother     No Known Problems Father     No Known Problems Sister     No Known Problems Daughter     No Known Problems Maternal  "Grandmother     Multiple myeloma Maternal Grandfather     No Known Problems Paternal Grandmother     No Known Problems Paternal Grandfather     Breast cancer Paternal Aunt         40s    Breast cancer Maternal Aunt 50    Breast cancer Maternal Aunt 70    No Known Problems Maternal Aunt     BRCA2 Positive Neg Hx     BRCA2 Negative Neg Hx     BRCA1 Positive Neg Hx     BRCA1 Negative Neg Hx     BRCA 1/2 Neg Hx     Ovarian cancer Neg Hx     Endometrial cancer Neg Hx     Colon cancer Neg Hx     Breast cancer additional onset Neg Hx      Allergies   Allergen Reactions    Adhesive [Medical Tape]     Suture      Vicryl brand    Sulfa Antibiotics Rash          Physical Exam  Vitals:    02/19/25 1450   Weight: 77.7 kg (171 lb 6.4 oz)   Height: 5' 4\" (1.626 m)       Constitutional:  see vital signs  Gen: well-developed, normocephalic/atraumatic, well-groomed  Eyes: No inflammation or discharge of conjunctiva or lids; sclera clear   Pulmonary/Chest: Effort normal. No respiratory distress.     Left Hand Exam     Tenderness   Left hand tenderness location: +minimally TTP over distal radius/ulnar. Non-ttp along digits, palmar/dorsal aspect of hand.     Range of Motion   Wrist   Extension:  20   Flexion:  30   Pronation:  90   Supination:  80     Muscle Strength   Wrist extension: 4/5   Wrist flexion: 4/5   :  4/5     Comments:  +ecchymosis along along aspect of wrist and distal half of forearm. No open wounds/drainage, erythema. Scant swelling of wrist    2+ radial pulse              Fracture / Dislocation Treatment    Date/Time: 2/19/2025 3:00 PM    Performed by: Leo Plata MD  Authorized by: Leo Plata MD    Patient Location:  Clinic  [place] Protocol:  procedure performed by consultantConsent: Verbal consent obtained.  Risks and benefits: risks, benefits and alternatives were discussed  Consent given by: patient  Radiology Images displayed and confirmed. If images not available, report reviewed: imaging studies " available  Required items: required blood products, implants, devices, and special equipment available    Injury location:  Wrist  Location details:  Left wrist  Injury type:  Fracture  Fracture type: distal radius and ulnar styloid    Fracture type: distal radius and ulnar styloid    Distal perfusion: normal    Neurological function: normal    Range of motion: reduced    Immobilization:  Other (comment) (Continuation of universal wrist brace at all times other than hygienic cleansing. Pt deferred short arm cast)

## 2025-03-05 ENCOUNTER — OFFICE VISIT (OUTPATIENT)
Dept: OBGYN CLINIC | Facility: CLINIC | Age: 70
End: 2025-03-05
Payer: MEDICARE

## 2025-03-05 ENCOUNTER — HOSPITAL ENCOUNTER (OUTPATIENT)
Dept: RADIOLOGY | Facility: CLINIC | Age: 70
Discharge: HOME | End: 2025-03-05
Payer: MEDICARE

## 2025-03-05 VITALS — BODY MASS INDEX: 28.68 KG/M2 | HEIGHT: 64 IN | WEIGHT: 168 LBS

## 2025-03-05 DIAGNOSIS — S52.612D CLOSED DISPLACED FRACTURE OF STYLOID PROCESS OF LEFT ULNA WITH ROUTINE HEALING, SUBSEQUENT ENCOUNTER: ICD-10-CM

## 2025-03-05 DIAGNOSIS — M25.532 ACUTE PAIN OF LEFT WRIST: ICD-10-CM

## 2025-03-05 DIAGNOSIS — M79.642 PAIN OF LEFT HAND: ICD-10-CM

## 2025-03-05 DIAGNOSIS — S52.592D OTHER CLOSED FRACTURE OF DISTAL END OF LEFT RADIUS WITH ROUTINE HEALING, SUBSEQUENT ENCOUNTER: Primary | ICD-10-CM

## 2025-03-05 PROCEDURE — 99213 OFFICE O/P EST LOW 20 MIN: CPT | Performed by: STUDENT IN AN ORGANIZED HEALTH CARE EDUCATION/TRAINING PROGRAM

## 2025-03-05 PROCEDURE — 73110 X-RAY EXAM OF WRIST: CPT

## 2025-03-05 NOTE — PROGRESS NOTES
1. Other closed fracture of distal end of left radius with routine healing, subsequent encounter        2. Pain of left hand  XR wrist 3+ vw left      3. Acute pain of left wrist  XR wrist 3+ vw left      4. Closed displaced fracture of styloid process of left ulna with routine healing, subsequent encounter          Orders Placed This Encounter   Procedures    XR wrist 3+ vw left        Imaging Studies (I personally reviewed images in PACS and report):    X-ray left wrist 3/5/2025:   Subacute healing distal radial fracture without displacement. Minimal displaced subacute ulnar styloid fracture  Subacute to remote dorsal triquetral fracture.  Soft tissue edema about the wrist      IMPRESSION:  Acute left hand/wrist pain secondary to FOOSH injury  Reports immediate wrist and hand pain along with swelling and bruising along the ulnar aspect of her hand and wrist  Radiographs noting nondisplaced mildly impacted distal radial fracture, ulnar styloid fracture, evidence of remote dorsal triquetral fracture. Healing changes noted on radiographs today  Symptoms improving/pain-free with use of her universal wrist brace and avoiding any strenuous lifting with left upper extremity since last visit    Date of Injury: 2/14/2025  Follow up interval: 2 weeks, 5 days    PLAN:  Repeat X-ray next visit:   Left wrist  Clinical exam and radiographic imaging reviewed with patient today, with impression as per above. I have discussed with the patient the pathophysiology of this diagnosis and reviewed how the examination correlates with this diagnosis.    Imaging obtained/reviewed as per above. I will follow up official radiology interpretation.  Reassured patient of her radiographic findings and counseled to continue conservative treatments with use of universal wrist brace at all times other than for hygienic cleansing.  Counseled again that she should avoid any strenuous lifting with her left upper extremity outside of activities of  "daily living as tolerated.    Return in about 3 weeks (around 3/26/2025).    Portions of the record may have been created with voice recognition software. Occasional wrong word or \"sound a like\" substitutions may have occurred due to the inherent limitations of voice recognition software. Read the chart carefully and recognize, using context, where substitutions have occurred.     CHIEF COMPLAINT:  Chief Complaint   Patient presents with    Left Hand - Pain         HPI:  Faiza Gonzalez is a 70 y.o. female  who presents for     Visit 3/5/2025:  Follow-up evaluation of left wrist distal radius/ulnar styloid fracture:  History as per below.  Last visit patient deferred short arm casting for continued use of her wrist brace.  She reports continued improvement since last visit.  She reports no pain of her left wrist or hand.  She states there is some mild swelling but otherwise tolerable.  She states the bruising has receded.  Denies any N/T of her left upper extremity.  Denies any new injuries of her left hand or wrist since last visit.  She reports minimal if any use of Tylenol at this point.    Visit 2/19/2025 :  Initial evaluation of left hand/wrist pain and injury:  Precipitating injury on 2/14/2025 after sustaining a fall on her outstretched left hand.  Patient also notes she had a separate remote injury of her right thumb after a crushing incident 2 months ago.  She states there is bruising and swelling along the base of her thumb at that time as well with limited range of motion.  She did not seek out medical care or imaging at that time and treated conservatively on her own.  in regards to her left wrist, she noted swelling and bruising with limited range of motion of her wrist.  Described pain as sharp/aching but nonradiating from her wrist.  She has been treating with use of wrist brace which she is currently wearing.  She feels that this intervention has significantly reduced the intensity of her pain and " feels is more of a mild aching sensation at this point.  She has been avoiding any strenuous lifting with her left upper extremity.  Denies any N/T of left upper extremity.  Sparing use of Tylenol for pain.  Denies prior surgeries of her left hand or wrist in the past.  She has not had any imaging of her wrist or hand since sustaining either of these injuries.    Medical, Surgical, Family, and Social History    Past Medical History:   Diagnosis Date    Abnormal colonoscopy     Anemia     acute blood loss anemia    Basal cell carcinoma (BCC)     Breast cancer (HCC) 09/18/2020    right breast    Colon polyp     Hiatal hernia     Hyperlipidemia     Melanoma in situ (HCC)     Squamous cell carcinoma in situ      Past Surgical History:   Procedure Laterality Date    BREAST BIOPSY Right 09/18/2020    2 sites; DCIS    COLONOSCOPY      EGD      HYSTERECTOMY  2013    INCONTINENCE SURGERY      LEFORT I OSTEOTOMY / CRANIOTOMY Bilateral     MANDIBLE SURGERY      MASTECTOMY Right 10/14/2020    TUBAL LIGATION       Social History   Social History     Substance and Sexual Activity   Alcohol Use Not Currently    Comment: wine occasionally     Social History     Substance and Sexual Activity   Drug Use Never     Social History     Tobacco Use   Smoking Status Never   Smokeless Tobacco Never     Family History   Problem Relation Age of Onset    No Known Problems Mother     No Known Problems Father     No Known Problems Sister     No Known Problems Daughter     No Known Problems Maternal Grandmother     Multiple myeloma Maternal Grandfather     No Known Problems Paternal Grandmother     No Known Problems Paternal Grandfather     Breast cancer Paternal Aunt         40s    Breast cancer Maternal Aunt 50    Breast cancer Maternal Aunt 70    No Known Problems Maternal Aunt     BRCA2 Positive Neg Hx     BRCA2 Negative Neg Hx     BRCA1 Positive Neg Hx     BRCA1 Negative Neg Hx     BRCA 1/2 Neg Hx     Ovarian cancer Neg Hx     Endometrial  "cancer Neg Hx     Colon cancer Neg Hx     Breast cancer additional onset Neg Hx      Allergies   Allergen Reactions    Adhesive [Medical Tape]     Suture      Vicryl brand    Sulfa Antibiotics Rash          Physical Exam  Vitals:    03/05/25 1057   Weight: 76.2 kg (168 lb)   Height: 5' 4\" (1.626 m)       Constitutional:  see vital signs  Gen: well-developed, normocephalic/atraumatic, well-groomed  Eyes: No inflammation or discharge of conjunctiva or lids; sclera clear   Pulmonary/Chest: Effort normal. No respiratory distress.     Left Hand Exam     Tenderness   The patient is experiencing no tenderness.     Range of Motion   Wrist   Extension:  20   Flexion:  20   Pronation:  80   Supination:  80     Comments:  Scant edema of the wrist.  Otherwise no erythema, ecchymosis, open wounds or drainage    2+ radial pulse    Patient seen with universal wrist brace on her left wrist today.  This was removed for examination.              Procedures        "

## 2025-03-26 ENCOUNTER — HOSPITAL ENCOUNTER (OUTPATIENT)
Dept: RADIOLOGY | Facility: CLINIC | Age: 70
Discharge: HOME | End: 2025-03-26
Payer: MEDICARE

## 2025-03-26 ENCOUNTER — OFFICE VISIT (OUTPATIENT)
Dept: OBGYN CLINIC | Facility: CLINIC | Age: 70
End: 2025-03-26

## 2025-03-26 VITALS — HEIGHT: 64 IN | WEIGHT: 168 LBS | BODY MASS INDEX: 28.68 KG/M2

## 2025-03-26 DIAGNOSIS — S52.592D OTHER CLOSED FRACTURE OF DISTAL END OF LEFT RADIUS WITH ROUTINE HEALING, SUBSEQUENT ENCOUNTER: Primary | ICD-10-CM

## 2025-03-26 DIAGNOSIS — S52.612D CLOSED DISPLACED FRACTURE OF STYLOID PROCESS OF LEFT ULNA WITH ROUTINE HEALING, SUBSEQUENT ENCOUNTER: ICD-10-CM

## 2025-03-26 DIAGNOSIS — S52.592D OTHER CLOSED FRACTURE OF DISTAL END OF LEFT RADIUS WITH ROUTINE HEALING, SUBSEQUENT ENCOUNTER: ICD-10-CM

## 2025-03-26 PROCEDURE — 99024 POSTOP FOLLOW-UP VISIT: CPT | Performed by: STUDENT IN AN ORGANIZED HEALTH CARE EDUCATION/TRAINING PROGRAM

## 2025-03-26 PROCEDURE — 73110 X-RAY EXAM OF WRIST: CPT

## 2025-03-26 NOTE — PROGRESS NOTES
"1. Other closed fracture of distal end of left radius with routine healing, subsequent encounter  XR wrist 3+ vw left      2. Closed displaced fracture of styloid process of left ulna with routine healing, subsequent encounter  XR wrist 3+ vw left        Orders Placed This Encounter   Procedures    XR wrist 3+ vw left        Imaging Studies (I personally reviewed images in PACS and report):    X-ray left wrist 3/26/2025:   Healing distal radial fracture without displacement. Minimal displaced subacute ulnar styloid fracture. Soft tissues unremarkable      IMPRESSION:  Nondisplaced mildly impacted distal radial fracture, ulnar styloid fracture, evidence of remote dorsal triquetral fracture.  Treated conservatively with use of universal wrist brace and restricted use of left hand  Clinically healed on exam today other than expected stiffness of the wrist.  Radiographic healing on imaging    Date of Injury: 2/14/2025  Follow up interval: 5 weeks, 5 days    PLAN:    Clinical exam and radiographic imaging reviewed with patient today, with impression as per above. I have discussed with the patient the pathophysiology of this diagnosis and reviewed how the examination correlates with this diagnosis.    Imaging obtained/reviewed as per above. I will follow up official radiology interpretation.  Reassured patient on radiographic imaging/clinical improvement. Can transition out of wrist brace and return to using left hand as tolerated.  Supplemented HEP for strengthening/improving motion going forward. Counseled use of heat therapy 20 minutes on/off every hour as needed to reduce stiffness.    Return if symptoms worsen or fail to improve.    Portions of the record may have been created with voice recognition software. Occasional wrong word or \"sound a like\" substitutions may have occurred due to the inherent limitations of voice recognition software. Read the chart carefully and recognize, using context, where substitutions " have occurred.     CHIEF COMPLAINT:  Chief Complaint   Patient presents with    Follow-up         HPI:  Faiza Gonzlaez is a 70 y.o. female  who presents for     Visit 3/26/2025:  Follow-up evaluation of left distal radius/ulnar styloid fracture:  Patient reports no issues of her wrist since last visit  She denies any noticeable pain of her wrist.  Denies discoloration, N/T of left upper extremity.  Has not been taking any pain medications for this issue.  She has reportedly been adherent to use of the wrist brace with only removing for hygienic purposes.  Denies new injuries of her left hand or wrist since last visit.    Visit 3/5/2025:  Follow-up evaluation of left wrist distal radius/ulnar styloid fracture:  History as per below.  Last visit patient deferred short arm casting for continued use of her wrist brace.  She reports continued improvement since last visit.  She reports no pain of her left wrist or hand.  She states there is some mild swelling but otherwise tolerable.  She states the bruising has receded.  Denies any N/T of her left upper extremity.  Denies any new injuries of her left hand or wrist since last visit.  She reports minimal if any use of Tylenol at this point.    Visit 2/19/2025 :  Initial evaluation of left hand/wrist pain and injury:  Precipitating injury on 2/14/2025 after sustaining a fall on her outstretched left hand.  Patient also notes she had a separate remote injury of her right thumb after a crushing incident 2 months ago.  She states there is bruising and swelling along the base of her thumb at that time as well with limited range of motion.  She did not seek out medical care or imaging at that time and treated conservatively on her own.  in regards to her left wrist, she noted swelling and bruising with limited range of motion of her wrist.  Described pain as sharp/aching but nonradiating from her wrist.  She has been treating with use of wrist brace which she is currently  wearing.  She feels that this intervention has significantly reduced the intensity of her pain and feels is more of a mild aching sensation at this point.  She has been avoiding any strenuous lifting with her left upper extremity.  Denies any N/T of left upper extremity.  Sparing use of Tylenol for pain.  Denies prior surgeries of her left hand or wrist in the past.  She has not had any imaging of her wrist or hand since sustaining either of these injuries.    Medical, Surgical, Family, and Social History    Past Medical History:   Diagnosis Date    Abnormal colonoscopy     Anemia     acute blood loss anemia    Basal cell carcinoma (BCC)     BRCA1 positive     BRCA2 positive     Breast cancer (HCC) 09/18/2020    right breast    Colon polyp     Fibrocystic breast     Hiatal hernia     Hyperlipidemia     Melanoma in situ (HCC)     Squamous cell carcinoma in situ      Past Surgical History:   Procedure Laterality Date    BREAST BIOPSY Right 09/18/2020    2 sites; DCIS    COLONOSCOPY      EGD      HYSTERECTOMY  2013    INCONTINENCE SURGERY      LEFORT I OSTEOTOMY / CRANIOTOMY Bilateral     MANDIBLE SURGERY      MASTECTOMY Right 10/14/2020    TUBAL LIGATION       Social History   Social History     Substance and Sexual Activity   Alcohol Use Not Currently    Comment: wine occasionally     Social History     Substance and Sexual Activity   Drug Use Never     Social History     Tobacco Use   Smoking Status Never   Smokeless Tobacco Never     Family History   Problem Relation Age of Onset    No Known Problems Mother     No Known Problems Father     No Known Problems Sister     No Known Problems Daughter     No Known Problems Maternal Grandmother     Multiple myeloma Maternal Grandfather     No Known Problems Paternal Grandmother     No Known Problems Paternal Grandfather     Breast cancer Paternal Aunt         40s    Breast cancer Maternal Aunt 50    Breast cancer Maternal Aunt 70    No Known Problems Maternal Aunt      "BRCA2 Positive Neg Hx     BRCA2 Negative Neg Hx     BRCA1 Positive Neg Hx     BRCA1 Negative Neg Hx     BRCA 1/2 Neg Hx     Ovarian cancer Neg Hx     Endometrial cancer Neg Hx     Colon cancer Neg Hx     Breast cancer additional onset Neg Hx      Allergies   Allergen Reactions    Adhesive [Medical Tape]     Suture      Vicryl brand    Sulfa Antibiotics Rash          Physical Exam  Vitals:    03/26/25 1156   Weight: 76.2 kg (168 lb)   Height: 5' 4\" (1.626 m)       Constitutional:  see vital signs  Gen: well-developed, normocephalic/atraumatic, well-groomed  Eyes: No inflammation or discharge of conjunctiva or lids; sclera clear   Pulmonary/Chest: Effort normal. No respiratory distress.     Left Hand Exam     Tenderness   The patient is experiencing no tenderness.     Range of Motion   Wrist   Extension:  20   Flexion:  20   Pronation:  80   Supination:  80     Muscle Strength   Wrist extension: 4/5   Wrist flexion: 5/5   :  5/5     Other   Erythema: absent    Comments:  No edema, erythema, ecchymosis, open wounds or drainage  2+ radial pulse  Digit ROM at the MCP/PIP/DIP intact and without malrotation  Sensation intact in radial/median/ulnar distribution    Patient seen with universal wrist brace on her left wrist today.  This was removed for examination.              Procedures        "

## 2025-05-27 ENCOUNTER — APPOINTMENT (OUTPATIENT)
Dept: LAB | Facility: HOSPITAL | Age: 70
End: 2025-05-27
Payer: MEDICARE

## 2025-05-27 DIAGNOSIS — E78.2 MIXED HYPERLIPIDEMIA: ICD-10-CM

## 2025-05-27 DIAGNOSIS — R73.01 IMPAIRED FASTING GLUCOSE: ICD-10-CM

## 2025-05-27 DIAGNOSIS — E55.9 VITAMIN D DEFICIENCY: ICD-10-CM

## 2025-05-27 LAB
25(OH)D3 SERPL-MCNC: 59.5 NG/ML (ref 30–100)
ALBUMIN SERPL BCG-MCNC: 4.6 G/DL (ref 3.5–5)
ALP SERPL-CCNC: 92 U/L (ref 34–104)
ALT SERPL W P-5'-P-CCNC: 21 U/L (ref 7–52)
ANION GAP SERPL CALCULATED.3IONS-SCNC: 7 MMOL/L (ref 4–13)
AST SERPL W P-5'-P-CCNC: 19 U/L (ref 13–39)
BASOPHILS # BLD AUTO: 0.06 THOUSANDS/ÂΜL (ref 0–0.1)
BASOPHILS NFR BLD AUTO: 1 % (ref 0–1)
BILIRUB SERPL-MCNC: 0.46 MG/DL (ref 0.2–1)
BUN SERPL-MCNC: 16 MG/DL (ref 5–25)
CALCIUM SERPL-MCNC: 10 MG/DL (ref 8.4–10.2)
CHLORIDE SERPL-SCNC: 103 MMOL/L (ref 96–108)
CHOLEST SERPL-MCNC: 238 MG/DL (ref ?–200)
CO2 SERPL-SCNC: 27 MMOL/L (ref 21–32)
CREAT SERPL-MCNC: 0.74 MG/DL (ref 0.6–1.3)
EOSINOPHIL # BLD AUTO: 0.06 THOUSAND/ÂΜL (ref 0–0.61)
EOSINOPHIL NFR BLD AUTO: 1 % (ref 0–6)
ERYTHROCYTE [DISTWIDTH] IN BLOOD BY AUTOMATED COUNT: 13.8 % (ref 11.6–15.1)
EST. AVERAGE GLUCOSE BLD GHB EST-MCNC: 123 MG/DL
GFR SERPL CREATININE-BSD FRML MDRD: 82 ML/MIN/1.73SQ M
GLUCOSE P FAST SERPL-MCNC: 103 MG/DL (ref 65–99)
HBA1C MFR BLD: 5.9 %
HCT VFR BLD AUTO: 44.1 % (ref 34.8–46.1)
HDLC SERPL-MCNC: 75 MG/DL
HGB BLD-MCNC: 14.3 G/DL (ref 11.5–15.4)
IMM GRANULOCYTES # BLD AUTO: 0.02 THOUSAND/UL (ref 0–0.2)
IMM GRANULOCYTES NFR BLD AUTO: 0 % (ref 0–2)
LDLC SERPL CALC-MCNC: 149 MG/DL (ref 0–100)
LYMPHOCYTES # BLD AUTO: 1.54 THOUSANDS/ÂΜL (ref 0.6–4.47)
LYMPHOCYTES NFR BLD AUTO: 26 % (ref 14–44)
MCH RBC QN AUTO: 28.1 PG (ref 26.8–34.3)
MCHC RBC AUTO-ENTMCNC: 32.4 G/DL (ref 31.4–37.4)
MCV RBC AUTO: 87 FL (ref 82–98)
MONOCYTES # BLD AUTO: 0.62 THOUSAND/ÂΜL (ref 0.17–1.22)
MONOCYTES NFR BLD AUTO: 11 % (ref 4–12)
NEUTROPHILS # BLD AUTO: 3.55 THOUSANDS/ÂΜL (ref 1.85–7.62)
NEUTS SEG NFR BLD AUTO: 61 % (ref 43–75)
NONHDLC SERPL-MCNC: 163 MG/DL
NRBC BLD AUTO-RTO: 0 /100 WBCS
PLATELET # BLD AUTO: 310 THOUSANDS/UL (ref 149–390)
PMV BLD AUTO: 10.1 FL (ref 8.9–12.7)
POTASSIUM SERPL-SCNC: 4 MMOL/L (ref 3.5–5.3)
PROT SERPL-MCNC: 7.3 G/DL (ref 6.4–8.4)
RBC # BLD AUTO: 5.09 MILLION/UL (ref 3.81–5.12)
SODIUM SERPL-SCNC: 137 MMOL/L (ref 135–147)
TRIGL SERPL-MCNC: 71 MG/DL (ref ?–150)
WBC # BLD AUTO: 5.85 THOUSAND/UL (ref 4.31–10.16)

## 2025-05-27 PROCEDURE — 80061 LIPID PANEL: CPT

## 2025-05-27 PROCEDURE — 85025 COMPLETE CBC W/AUTO DIFF WBC: CPT

## 2025-05-27 PROCEDURE — 83036 HEMOGLOBIN GLYCOSYLATED A1C: CPT

## 2025-05-27 PROCEDURE — 80053 COMPREHEN METABOLIC PANEL: CPT

## 2025-05-27 PROCEDURE — 36415 COLL VENOUS BLD VENIPUNCTURE: CPT

## 2025-05-27 PROCEDURE — 82306 VITAMIN D 25 HYDROXY: CPT

## 2025-07-23 ENCOUNTER — HOSPITAL ENCOUNTER (OUTPATIENT)
Dept: RADIOLOGY | Facility: CLINIC | Age: 70
Discharge: HOME | End: 2025-07-23
Payer: MEDICARE

## 2025-07-23 VITALS — WEIGHT: 168 LBS | HEIGHT: 64 IN | BODY MASS INDEX: 28.68 KG/M2

## 2025-07-23 DIAGNOSIS — Z85.3 PERSONAL HISTORY OF MALIGNANT NEOPLASM OF BREAST: ICD-10-CM

## 2025-07-23 PROCEDURE — 77065 DX MAMMO INCL CAD UNI: CPT

## 2025-07-23 PROCEDURE — G0279 TOMOSYNTHESIS, MAMMO: HCPCS
